# Patient Record
Sex: FEMALE | Race: WHITE | Employment: FULL TIME | URBAN - METROPOLITAN AREA
[De-identification: names, ages, dates, MRNs, and addresses within clinical notes are randomized per-mention and may not be internally consistent; named-entity substitution may affect disease eponyms.]

---

## 2017-02-23 ENCOUNTER — OFFICE VISIT (OUTPATIENT)
Dept: INTERNAL MEDICINE CLINIC | Age: 27
End: 2017-02-23

## 2017-02-23 VITALS
TEMPERATURE: 99.2 F | HEART RATE: 104 BPM | SYSTOLIC BLOOD PRESSURE: 139 MMHG | DIASTOLIC BLOOD PRESSURE: 69 MMHG | WEIGHT: 171.2 LBS | HEIGHT: 66 IN | RESPIRATION RATE: 16 BRPM | BODY MASS INDEX: 27.51 KG/M2 | OXYGEN SATURATION: 98 %

## 2017-02-23 DIAGNOSIS — Z82.49 FAM HX-ISCHEM HEART DISEASE: ICD-10-CM

## 2017-02-23 DIAGNOSIS — Z00.00 ROUTINE GENERAL MEDICAL EXAMINATION AT A HEALTH CARE FACILITY: Primary | ICD-10-CM

## 2017-02-23 RX ORDER — BISMUTH SUBSALICYLATE 262 MG
1 TABLET,CHEWABLE ORAL DAILY
COMMUNITY
End: 2019-01-07 | Stop reason: ALTCHOICE

## 2017-02-23 RX ORDER — NORETHINDRONE ACETATE AND ETHINYL ESTRADIOL AND FERROUS FUMARATE 1MG-20(24)
KIT ORAL
Refills: 11 | COMMUNITY
Start: 2017-02-13 | End: 2019-01-07 | Stop reason: ALTCHOICE

## 2017-02-23 NOTE — PROGRESS NOTES
Subjective:   27 y.o. female for Well Woman Check. She is a new patient to our practice. Her gyne and breast care is done elsewhere by her Ob-Gyne physician.  3/2016 with Dr. Melony Meek. Patient Active Problem List    Diagnosis Date Noted    Anxiety      Current Outpatient Prescriptions   Medication Sig Dispense Refill    MINASTRIN 24 FE 1 mg-20 mcg(24) /75 mg (4) chew CHEW ONE TABLET AT THE SAME TIME DAILY  11    multivitamin (ONE A DAY) tablet Take 1 Tab by mouth daily. No Known Allergies  Past Medical History:   Diagnosis Date    Anxiety      Past Surgical History:   Procedure Laterality Date    HX APPENDECTOMY  2009     Family History   Problem Relation Age of Onset    Psychiatric Disorder Mother      severe depression, substance abuse    Hypertension Mother     Alcohol abuse Mother     Hypertension Father     Heart Disease Father      MI age 52    Psychiatric Disorder Sister      severe anxiety and depression    Psychiatric Disorder Brother     Heart Disease Paternal Grandmother 28     Fatal MI age 28     Social History   Substance Use Topics    Smoking status: Never Smoker    Smokeless tobacco: Never Used    Alcohol use 0.0 - 0.6 oz/week     0 - 1 Glasses of wine per week         Specific concerns today:   Blood pressure was elevated 2 years ago. Placed on antianxiety and antidepressant medication (Prozac and lorazepam). Helped to lower blood pressure but stopped lorazepam after 1 week - made her tired and tingly, stopped Prozac after 1 month. Was depressed when younger but better. Still with anxiety issues. Has not seen a therapist in the past.  Has issues also every day. Interfering socially - issues talking with people or being in crowds. Boyfriend is calming.       Review of Systems  Constitutional: negative  Eyes: negative  Ears, nose, mouth, throat, and face: negative  Respiratory: negative  Cardiovascular: negative except for palpitations  Gastrointestinal: negative  Genitourinary:negative  Integument/breast: negative  Hematologic/lymphatic: negative  Musculoskeletal:negative except for knee pain. Had + TIFFANY at age 14. Neurological: negative except for headaches  Behavioral/Psych: negative except for anxiety  Endocrine: negative  Allergic/Immunologic: negative    Objective:   Blood pressure 139/69, pulse (!) 104, temperature 99.2 °F (37.3 °C), temperature source Oral, resp. rate 16, height 5' 6\" (1.676 m), weight 171 lb 3.2 oz (77.7 kg), SpO2 98 %. Physical Examination:   General appearance - alert, well appearing, and in no distress and oriented to person, place, and time  Mental status - alert, oriented to person, place, and time, normal mood, behavior, speech, dress, motor activity, and thought processes  Eyes - pupils equal and reactive, extraocular eye movements intact, funduscopic exam normal, discs flat and sharp  Ears - bilateral TM's and external ear canals normal  Nose - normal and patent, no erythema, discharge or polyps  Mouth - mucous membranes moist, pharynx normal without lesions  Neck - supple, no significant adenopathy, carotids upstroke normal bilaterally, no bruits, thyroid exam: thyroid is normal in size without nodules or tenderness  Lymphatics - no palpable lymphadenopathy, no hepatosplenomegaly  Chest - clear to auscultation, no wheezes, rales or rhonchi, symmetric air entry  Heart - normal rate, regular rhythm, normal S1, S2, no murmurs, rubs, clicks or gallops  Abdomen - soft, nontender, nondistended, no masses or organomegaly  bowel sounds normal  Breasts - breasts appear normal, no suspicious masses, no skin or nipple changes or axillary nodes  Back exam - full range of motion, no tenderness, palpable spasm or pain on motion  Neurological - alert, oriented, normal speech, no focal findings or movement disorder noted. Anxious appearing in room.     Musculoskeletal - no joint tenderness, deformity or swelling  Extremities - peripheral pulses normal, no pedal edema, no clubbing or cyanosis  Skin - normal coloration and turgor, no rashes, no suspicious skin lesions noted     Assessment/Plan:   19yo female  Anxiety - discussed starting zoloft. Pt will consider.     HM - regular exercise, weight loss  fam hx early onset CAD - check lipids and cmp  call if any problems  Orders Placed This Encounter    METABOLIC PANEL, COMPREHENSIVE    LIPID PANEL    MINASTRIN 24 FE 1 mg-20 mcg(24) /75 mg (4) chew    multivitamin (ONE A DAY) tablet     Follow-up Disposition: Not on File

## 2017-02-23 NOTE — PATIENT INSTRUCTIONS
[facility] Activation    Thank you for requesting access to [facility]. Please follow the instructions below to securely access and download your online medical record. [facility] allows you to send messages to your doctor, view your test results, renew your prescriptions, schedule appointments, and more. How Do I Sign Up? 1. In your internet browser, go to [email or web address]  2. Click on the First Time User? Click Here link in the Sign In box. You will be redirect to the New Member Sign Up page. 3. Enter your [facility] Access Code exactly as it appears below. You will not need to use this code after youve completed the sign-up process. If you do not sign up before the expiration date, you must request a new code. [facility] Access Code: [email or web address]  Expires: 2017 10:51 AM (This is the date your [facility] access code will )    4. Enter the last four digits of your Social Security Number (xxxx) and Date of Birth (mm/dd/yyyy) as indicated and click Submit. You will be taken to the next sign-up page. 5. Create a [facility] ID. This will be your [facility] login ID and cannot be changed, so think of one that is secure and easy to remember. 6. Create a [facility] password. You can change your password at any time. 7. Enter your Password Reset Question and Answer. This can be used at a later time if you forget your password. 8. Enter your e-mail address. You will receive e-mail notification when new information is available in [address] 9. Click Sign Up. You can now view and download portions of your medical record. 10. Click the Download Summary menu link to download a portable copy of your medical information. Additional Information    If you have questions, please visit the Frequently Asked Questions section of the [facility] website at [email or web address] Remember, [facility] is NOT to be used for urgent needs. For medical emergencies, dial 911.

## 2017-03-10 LAB
ALBUMIN SERPL-MCNC: 4.4 G/DL (ref 3.5–5.5)
ALBUMIN/GLOB SERPL: 1.6 {RATIO} (ref 1.1–2.5)
ALP SERPL-CCNC: 27 IU/L (ref 39–117)
ALT SERPL-CCNC: 10 IU/L (ref 0–32)
AST SERPL-CCNC: 16 IU/L (ref 0–40)
BILIRUB SERPL-MCNC: 0.5 MG/DL (ref 0–1.2)
BUN SERPL-MCNC: 12 MG/DL (ref 6–20)
BUN/CREAT SERPL: 16 (ref 8–20)
CALCIUM SERPL-MCNC: 9 MG/DL (ref 8.7–10.2)
CHLORIDE SERPL-SCNC: 99 MMOL/L (ref 96–106)
CHOLEST SERPL-MCNC: 256 MG/DL (ref 100–199)
CO2 SERPL-SCNC: 23 MMOL/L (ref 18–29)
CREAT SERPL-MCNC: 0.76 MG/DL (ref 0.57–1)
GLOBULIN SER CALC-MCNC: 2.8 G/DL (ref 1.5–4.5)
GLUCOSE SERPL-MCNC: 83 MG/DL (ref 65–99)
HDLC SERPL-MCNC: 67 MG/DL
INTERPRETATION, 910389: NORMAL
LDLC SERPL CALC-MCNC: 173 MG/DL (ref 0–99)
POTASSIUM SERPL-SCNC: 4.1 MMOL/L (ref 3.5–5.2)
PROT SERPL-MCNC: 7.2 G/DL (ref 6–8.5)
SODIUM SERPL-SCNC: 139 MMOL/L (ref 134–144)
TRIGL SERPL-MCNC: 81 MG/DL (ref 0–149)
VLDLC SERPL CALC-MCNC: 16 MG/DL (ref 5–40)

## 2017-04-24 ENCOUNTER — TELEPHONE (OUTPATIENT)
Dept: INTERNAL MEDICINE CLINIC | Age: 27
End: 2017-04-24

## 2017-04-24 RX ORDER — SERTRALINE HYDROCHLORIDE 50 MG/1
50 TABLET, FILM COATED ORAL DAILY
Qty: 30 TAB | Refills: 5 | Status: SHIPPED | OUTPATIENT
Start: 2017-04-24 | End: 2019-01-07 | Stop reason: ALTCHOICE

## 2017-04-24 NOTE — TELEPHONE ENCOUNTER
----- Message from [facility] sent at 4/24/2017  8:30 AM EDT -----  Regarding: FW: Visit Follow-Up Question  Contact: [phone]      ----- Message -----     From: Mala Leahy LPN     Sent: 1/22/9825   8:05 AM       To: Didier Ortega  Subject: FW: Visit Follow-Up Question                         ----- Message -----     From: Vivian Ríos     Sent: 4/21/2017   9:54 PM       To: Hector Jin  Subject: Visit Follow-Up Question                         I have thought about what we talked about and I would like to go ahead and try the medication for anxiety and depression you suggested.

## 2017-04-27 ENCOUNTER — TELEPHONE (OUTPATIENT)
Dept: INTERNAL MEDICINE CLINIC | Age: 27
End: 2017-04-27

## 2017-04-27 RX ORDER — ALPRAZOLAM 0.5 MG/1
.25-.5 TABLET ORAL
Qty: 30 TAB | Refills: 0 | OUTPATIENT
Start: 2017-04-27 | End: 2017-07-18 | Stop reason: SDUPTHER

## 2017-04-27 NOTE — TELEPHONE ENCOUNTER
I sent her a message on mychart earlier today. Please stress she needs to continue on the zoloft. Takes 4-6 weeks to see full effects. Given rx for xanax to take SPARINGLy - this should be in your box to call in.

## 2017-04-27 NOTE — TELEPHONE ENCOUNTER
Pt stated the Zoloft is not working and she still has anxiety. She is requesting a different medication. Pts number is [phone] and [facility].              From answering service

## 2017-04-27 NOTE — TELEPHONE ENCOUNTER
----- Message from Tristen Sena LPN sent at 0/00/0982  7:55 AM EDT -----  Regarding: FW: Visit Follow-Up Question  Contact: [phone]      ----- Message -----     From: Papa Espino     Sent: 4/26/2017   7:30 PM       To: Marshall Jin  Subject: RE: Visit Follow-Up Question                     The last week I have been having really bad anxiety attacks. I've been having trouble eating,  being nauseous, feeling a heaviness in my chest like I can't breathe, and feeling hyper sensitive. It's gotten to the point where I'm having troubles sleeping now. [facility] and [facility] told me it was anxiety and to follow up with you.  ----- Message -----  From: Finesse Andres MD  Sent: 4/24/2017  2:09 PM EDT  To: Papa Espino  Subject: RE: Visit Follow-Up Question    I have sent in the prescription.      ----- Message -----     From: Papa Espino     Sent: 4/24/2017 12:24 PM EDT       To: Finesse Andres MD  Subject: RE: Visit Follow-Up Question    [facility]. Thank you for your help.  ----- Message -----  From: Finesse Andres MD  Sent: 4/24/2017  8:46 AM EDT  To: Papa Espino  Subject: RE: Visit Follow-Up Question    I'm sorry, I don't have a pharmacy on file. What local pharmacy do you prefer?    ----- Message -----     From: Papa Espino     Sent: 4/21/2017  9:54 PM EDT       To: Finesse Andres MD  Subject: Visit Follow-Up Question    I have thought about what we talked about and I would like to go ahead and try the medication for anxiety and depression you suggested.

## 2017-04-28 NOTE — TELEPHONE ENCOUNTER
Discussed. Will decrease zoloft to 25mg for 1-2 weeks then increase back to 50. Reassured her that panic attacks were not coming from the zoloft but that anxiety often gets worse before it gets better. Xanax prn.   Referred to psych

## 2017-04-28 NOTE — TELEPHONE ENCOUNTER
Patient is not comfortable taking the prescribed dose. States if you need to see her to discuss she will come in. Please advise. You should have some my chart messages as well. Not sure if some of these crossed.

## 2019-01-07 ENCOUNTER — OFFICE VISIT (OUTPATIENT)
Dept: INTERNAL MEDICINE CLINIC | Age: 29
End: 2019-01-07

## 2019-01-07 VITALS
DIASTOLIC BLOOD PRESSURE: 78 MMHG | RESPIRATION RATE: 16 BRPM | SYSTOLIC BLOOD PRESSURE: 122 MMHG | WEIGHT: 225 LBS | HEIGHT: 66 IN | TEMPERATURE: 98.6 F | HEART RATE: 80 BPM | OXYGEN SATURATION: 97 % | BODY MASS INDEX: 36.16 KG/M2

## 2019-01-07 DIAGNOSIS — Z00.00 ROUTINE GENERAL MEDICAL EXAMINATION AT A HEALTH CARE FACILITY: Primary | ICD-10-CM

## 2019-01-07 PROBLEM — E66.01 SEVERE OBESITY (HCC): Status: ACTIVE | Noted: 2019-01-07

## 2019-01-07 RX ORDER — NORETHINDRONE ACETATE AND ETHINYL ESTRADIOL AND FERROUS FUMARATE 1MG-20(21)
KIT ORAL
Refills: 12 | COMMUNITY
Start: 2018-12-20 | End: 2021-06-27

## 2019-01-07 NOTE — PROGRESS NOTES
Subjective:  
34 y.o. female for Well Woman Check. Her gyne and breast care is done elsewhere by her Ob-Gyne physician. UTD with pap. Due for yearly f/u Patient Active Problem List  
 Diagnosis Date Noted  Severe obesity ([facility] 01/07/2019  Fam hx-ischem heart disease 02/23/2017  Anxiety Current Outpatient Medications Medication Sig Dispense Refill  JUNEL FE 1/20, 28, 1 mg-20 mcg (21)/75 mg (7) tab TAKE 1 TABLET BY MOUTH EVERY DAY  12 No Known Allergies Past Medical History:  
Diagnosis Date  Anxiety  Sepsis ([facility] 09/2018 Past Surgical History:  
Procedure Laterality Date  HX APPENDECTOMY  2009 Family History Problem Relation Age of Onset  Psychiatric Disorder Mother   
     severe depression, substance abuse  Hypertension Mother  Alcohol abuse Mother  Hypertension Father  Heart Disease Father MI age 46  Psychiatric Disorder Sister   
     severe anxiety and depression  Psychiatric Disorder Brother  Heart Disease Paternal Grandmother 28 Fatal MI age 28 Social History Tobacco Use  Smoking status: Never Smoker  Smokeless tobacco: Never Used Substance Use Topics  Alcohol use: Yes Alcohol/week: 0.0 - 0.6 oz Specific concerns today:  
Weight gain in the last year. Feels when taking the antidepressants lost all motivation. Stopped going to the gym and wasn't eating well. Stopped her meds in November, feels anxiety/depression is better. Planning on restarting at the gym. She is eating better. In 2017 she was able to drop to 140. She was 170 in the fall of that year. Review of Systems Constitutional: negative Eyes: negative except for contacts/glasses and last eye exam appro 1.5 years ago Ears, nose, mouth, throat, and face: negative Respiratory: negative Cardiovascular: negative Gastrointestinal: negative Genitourinary:negative Integument/breast: negative Hematologic/lymphatic: negative Musculoskeletal:negative Neurological: negative Behavioral/Psych: negative Endocrine: negative Allergic/Immunologic: negative Objective:  
Blood pressure 122/78, pulse 80, temperature 98.6 °F (37 °C), temperature source Oral, resp. rate 16, height 5' 6\" (1.676 m), weight 225 lb (102.1 kg), SpO2 97 %. Physical Examination:  
General appearance - alert, well appearing, and in no distress and oriented to person, place, and time Mental status - alert, oriented to person, place, and time, normal mood, behavior, speech, dress, motor activity, and thought processes Eyes - pupils equal and reactive, extraocular eye movements intact Ears - bilateral TM's and external ear canals normal 
Nose - normal and patent, no erythema, discharge or polyps Mouth - mucous membranes moist, pharynx normal without lesions Neck - supple, no significant adenopathy, carotids upstroke normal bilaterally, no bruits, thyroid exam: thyroid is normal in size without nodules or tenderness Lymphatics - no palpable lymphadenopathy, no hepatosplenomegaly Chest - clear to auscultation, no wheezes, rales or rhonchi, symmetric air entry Heart - normal rate, regular rhythm, normal S1, S2, no murmurs, rubs, clicks or gallops Abdomen - soft, nontender, nondistended, no masses or organomegaly 
bowel sounds normal 
Breasts - breasts appear normal, no suspicious masses, no skin or nipple changes or axillary nodes Back exam - full range of motion, no tenderness, palpable spasm or pain on motion Neurological - alert, oriented, normal speech, no focal findings or movement disorder noted Musculoskeletal - no joint tenderness, deformity or swelling Extremities - peripheral pulses normal, no pedal edema, no clubbing or cyanosis Skin - normal coloration and turgor, no rashes, no suspicious skin lesions noted Assessment/Plan:  
33yo female Depression - improved, continue off meds Severe obesity - discussed improved diet and exercise for weight loss. With recent severe weight gain, will check TSH Hyperlipidemia - repeat labs HM - declines flu shot 
call if any problems Orders Placed This Encounter  METABOLIC PANEL, COMPREHENSIVE  LIPID PANEL  
 TSH 3RD GENERATION  
 JUNEL FE 1/20, 28, 1 mg-20 mcg (21)/75 mg (7) tab Follow-up Disposition: Not on File

## 2019-01-07 NOTE — PROGRESS NOTES
Patient identified with 2 ID's, Name and  Dusty Hector is a 34 y.o. female Chief Complaint Patient presents with  Annual Exam  
 
 
1. Have you been to the ER, urgent care clinic since your last visit? Hospitalized since your last visit? Yes admitted to [facility] St[facility] for pneumonia then turned septic 2018 2. Have you seen or consulted any other health care providers outside of the [address] [place] [address] since your last visit? Include any pap smears or colon screening. None other than above episode Health Maintenance Topic Date Due  
 HPV Age 9Y-34Y (1 - Female 3-dose series) 2003  DTaP/Tdap/Td series (1 - Tdap) 2013  PAP AKA CERVICAL CYTOLOGY  2017  Influenza Age 5 to Adult  2019 (Originally 2018) HPV have been completed, Tdap not done-recommended, PAP done 2018, flu vac. declined Visit Vitals /78 (BP 1 Location: Left arm, BP Patient Position: Sitting) Pulse 80 Temp 98.6 °F (37 °C) (Oral) Resp 16 Ht 5' 6\" (1.676 m) Wt 225 lb (102.1 kg) SpO2 97% BMI 36.32 kg/m² Medication Reconciliation reviewed with patient on this date PHQ over the last two weeks 2019 Little interest or pleasure in doing things Not at all Feeling down, depressed, irritable, or hopeless Not at all Total Score PHQ 2 0 Learning Assessment 2017 PRIMARY LEARNER Patient HIGHEST LEVEL OF EDUCATION - PRIMARY LEARNER  SOME COLLEGE  
BARRIERS PRIMARY LEARNER NONE  
CO-LEARNER CAREGIVER No  
PRIMARY LANGUAGE ENGLISH  NEED No  
LEARNER PREFERENCE PRIMARY DEMONSTRATION  
ANSWERED BY patient RELATIONSHIP SELF

## 2019-02-07 ENCOUNTER — OFFICE VISIT (OUTPATIENT)
Dept: INTERNAL MEDICINE CLINIC | Age: 29
End: 2019-02-07

## 2019-02-07 ENCOUNTER — TELEPHONE (OUTPATIENT)
Dept: INTERNAL MEDICINE CLINIC | Age: 29
End: 2019-02-07

## 2019-02-07 VITALS
HEART RATE: 88 BPM | OXYGEN SATURATION: 98 % | SYSTOLIC BLOOD PRESSURE: 119 MMHG | TEMPERATURE: 97.9 F | BODY MASS INDEX: 35.84 KG/M2 | RESPIRATION RATE: 18 BRPM | DIASTOLIC BLOOD PRESSURE: 76 MMHG | WEIGHT: 223 LBS | HEIGHT: 66 IN

## 2019-02-07 DIAGNOSIS — R79.89 ELEVATED TSH: Primary | ICD-10-CM

## 2019-02-07 DIAGNOSIS — J45.30 MILD PERSISTENT REACTIVE AIRWAY DISEASE WITHOUT COMPLICATION: Primary | ICD-10-CM

## 2019-02-07 LAB
ALBUMIN SERPL-MCNC: 3.9 G/DL (ref 3.5–5.5)
ALBUMIN/GLOB SERPL: 1.3 {RATIO} (ref 1.2–2.2)
ALP SERPL-CCNC: 40 IU/L (ref 39–117)
ALT SERPL-CCNC: 9 IU/L (ref 0–32)
AST SERPL-CCNC: 12 IU/L (ref 0–40)
BILIRUB SERPL-MCNC: 0.3 MG/DL (ref 0–1.2)
BUN SERPL-MCNC: 9 MG/DL (ref 6–20)
BUN/CREAT SERPL: 12 (ref 9–23)
CALCIUM SERPL-MCNC: 9.5 MG/DL (ref 8.7–10.2)
CHLORIDE SERPL-SCNC: 105 MMOL/L (ref 96–106)
CHOLEST SERPL-MCNC: 249 MG/DL (ref 100–199)
CO2 SERPL-SCNC: 22 MMOL/L (ref 20–29)
CREAT SERPL-MCNC: 0.78 MG/DL (ref 0.57–1)
GLOBULIN SER CALC-MCNC: 3.1 G/DL (ref 1.5–4.5)
GLUCOSE SERPL-MCNC: 92 MG/DL (ref 65–99)
HDLC SERPL-MCNC: 52 MG/DL
INTERPRETATION, 910389: NORMAL
LDLC SERPL CALC-MCNC: 166 MG/DL (ref 0–99)
POTASSIUM SERPL-SCNC: 5.1 MMOL/L (ref 3.5–5.2)
PROT SERPL-MCNC: 7 G/DL (ref 6–8.5)
SODIUM SERPL-SCNC: 141 MMOL/L (ref 134–144)
TRIGL SERPL-MCNC: 157 MG/DL (ref 0–149)
TSH SERPL DL<=0.005 MIU/L-ACNC: 6.19 UIU/ML (ref 0.45–4.5)
VLDLC SERPL CALC-MCNC: 31 MG/DL (ref 5–40)

## 2019-02-07 RX ORDER — FLUTICASONE FUROATE AND VILANTEROL 100; 25 UG/1; UG/1
1 POWDER RESPIRATORY (INHALATION) DAILY
Qty: 1 INHALER | Refills: 0 | Status: SHIPPED | OUTPATIENT
Start: 2019-02-07 | End: 2020-03-04 | Stop reason: ALTCHOICE

## 2019-02-07 NOTE — PROGRESS NOTES
HISTORY OF PRESENT ILLNESS Mehdi Guadalupe is a 34 y.o. female. Coughing for last 1.5 months. [facility]. CXR was normal.  Placed on zpack which did not help. Paroxysmal cough, last was 2 days ago, lasted for 2-3 hours and vomited. Wheezing while coughing. Denies sinus pressure, post nasal drainage, rhinorrhea, sore throat or ear pain. In between episodes feels fine. No preceding cold. Last couple of times occurred at her dad's house. Last time had paroxysmal cough was post hospitalization for pneumonia in fall 2018. Lasted another and then resolved but now back again. No new environmental exposures. Current Outpatient Medications:  
  JUNEL FE 1/20, 28, 1 mg-20 mcg (21)/75 mg (7) tab, TAKE 1 TABLET BY MOUTH EVERY DAY, Disp: , Rfl: 12 Visit Vitals /76 (BP 1 Location: Left arm, BP Patient Position: At rest) Pulse 88 Temp 97.9 °F (36.6 °C) (Oral) Resp 18 Ht 5' 6\" (1.676 m) Wt 223 lb (101.2 kg) SpO2 98% BMI 35.99 kg/m² ROS See above. Physical Exam  
Constitutional: She appears well-developed and well-nourished. HENT:  
Head: Normocephalic and atraumatic. Right Ear: External ear normal.  
Left Ear: External ear normal.  
Nose: Nose normal.  
Mouth/Throat: Oropharynx is clear and moist. No oropharyngeal exudate. Neck: Neck supple. Cardiovascular: Normal rate, regular rhythm and normal heart sounds. Exam reveals no gallop and no friction rub. No murmur heard. Pulmonary/Chest: Effort normal and breath sounds normal.  
Lymphadenopathy:  
  She has no cervical adenopathy. Vitals reviewed. ASSESSMENT and PLAN Paroxysmal cough/reactive airway dz - does not appear allergy related. ? Inflammation still related to recent pneumonia. Trial Breo x 1 month. May need PFTs Orders Placed This Encounter  fluticasone-vilanterol (BREO ELLIPTA) 100-25 mcg/dose inhaler Follow-up Disposition: 
Return in about 4 weeks (around 3/7/2019) for sully airway gurpreet.

## 2019-02-07 NOTE — PROGRESS NOTES
1. Have you been to the ER, urgent care clinic since your last visit? Hospitalized since your last visit? No 
 
2. Have you seen or consulted any other health care providers outside of the [address] [place] [address] since your last visit? Include any pap smears or colon screening. No  
Chief Complaint Patient presents with  Cough  
  for about a month and a half Not fasting Abuse Screening Questionnaire 2/7/2019 Do you ever feel afraid of your partner? Shellie Enriquez Are you in a relationship with someone who physically or mentally threatens you? Shellie Enriquez Is it safe for you to go home? Y  
 
PHQ over the last two weeks 2/7/2019 Little interest or pleasure in doing things Not at all Feeling down, depressed, irritable, or hopeless Not at all Total Score PHQ 2 0

## 2019-02-14 LAB
T4 FREE SERPL-MCNC: 1.16 NG/DL (ref 0.82–1.77)
TSH SERPL DL<=0.005 MIU/L-ACNC: 5.45 UIU/ML (ref 0.45–4.5)

## 2019-02-15 ENCOUNTER — TELEPHONE (OUTPATIENT)
Dept: INTERNAL MEDICINE CLINIC | Age: 29
End: 2019-02-15

## 2019-02-15 DIAGNOSIS — E03.9 ACQUIRED HYPOTHYROIDISM: Primary | ICD-10-CM

## 2019-02-15 RX ORDER — LEVOTHYROXINE SODIUM 50 UG/1
50 TABLET ORAL
Qty: 30 TAB | Refills: 11 | Status: SHIPPED | OUTPATIENT
Start: 2019-02-15 | End: 2019-05-28 | Stop reason: SDUPTHER

## 2019-02-15 NOTE — TELEPHONE ENCOUNTER
Repeat TSH confirms hypothyroidism. Both sisters are hypothyroid. Will start levothyroxine 50mg every day. Repeat labs in 6 weeks.

## 2019-03-20 ENCOUNTER — OFFICE VISIT (OUTPATIENT)
Dept: INTERNAL MEDICINE CLINIC | Age: 29
End: 2019-03-20

## 2019-03-20 VITALS
OXYGEN SATURATION: 98 % | DIASTOLIC BLOOD PRESSURE: 72 MMHG | HEIGHT: 66 IN | SYSTOLIC BLOOD PRESSURE: 109 MMHG | WEIGHT: 223 LBS | TEMPERATURE: 98.2 F | BODY MASS INDEX: 35.84 KG/M2 | HEART RATE: 72 BPM

## 2019-03-20 DIAGNOSIS — J45.30 MILD PERSISTENT REACTIVE AIRWAY DISEASE WITHOUT COMPLICATION: Primary | ICD-10-CM

## 2019-03-29 ENCOUNTER — HOSPITAL ENCOUNTER (OUTPATIENT)
Dept: PULMONOLOGY | Age: 29
Discharge: HOME | End: 2019-03-29
Attending: INTERNAL MEDICINE
Payer: COMMERCIAL

## 2019-03-29 DIAGNOSIS — J45.30 MILD PERSISTENT REACTIVE AIRWAY DISEASE WITHOUT COMPLICATION: ICD-10-CM

## 2019-03-29 DIAGNOSIS — E03.9 ACQUIRED HYPOTHYROIDISM: ICD-10-CM

## 2019-03-29 PROCEDURE — 94010 BREATHING CAPACITY TEST: CPT

## 2019-05-28 DIAGNOSIS — E03.9 ACQUIRED HYPOTHYROIDISM: ICD-10-CM

## 2019-05-28 RX ORDER — LEVOTHYROXINE SODIUM 50 UG/1
50 TABLET ORAL
Qty: 30 TAB | Refills: 11 | Status: SHIPPED | OUTPATIENT
Start: 2019-05-28 | End: 2019-05-28 | Stop reason: SDUPTHER

## 2020-03-04 ENCOUNTER — OFFICE VISIT (OUTPATIENT)
Dept: INTERNAL MEDICINE CLINIC | Age: 30
End: 2020-03-04

## 2020-03-04 VITALS
WEIGHT: 193 LBS | TEMPERATURE: 98.5 F | HEIGHT: 66 IN | SYSTOLIC BLOOD PRESSURE: 119 MMHG | BODY MASS INDEX: 31.02 KG/M2 | DIASTOLIC BLOOD PRESSURE: 74 MMHG | OXYGEN SATURATION: 98 % | HEART RATE: 92 BPM

## 2020-03-04 DIAGNOSIS — E03.9 ACQUIRED HYPOTHYROIDISM: Primary | ICD-10-CM

## 2020-03-04 NOTE — PROGRESS NOTES
Chief Complaint   Patient presents with    Follow-up     Visit Vitals  /74   Pulse 92   Temp 98.5 °F (36.9 °C) (Oral)   Ht 5' 6\" (1.676 m)   Wt 193 lb (87.5 kg)   SpO2 98%   BMI 31.15 kg/m²     1. Have you been to the ER, urgent care clinic since your last visit? Hospitalized since your last visit? no    2. Have you seen or consulted any other health care providers outside of the [address] [place] [address] since your last visit? Include any pap smears or colon screening.  no

## 2020-03-04 NOTE — PROGRESS NOTES
Subjective:      Sushant Hernandez is an 34 y.o. female who presents for followup of hypothyroidism. Thyroid ROS: denies fatigue, weight changes, heat/cold intolerance, bowel/skin changes or CVS symptoms. She has lost 30 lbs since starting the levothyroxine. Now eating healthier and continuing to lose weight. Increased steps to 7000 per day and exercising 3 days a week. UTD with gyn. Current Outpatient Medications   Medication Sig Dispense Refill    levothyroxine (SYNTHROID) 50 mcg tablet TAKE 1 TABLET BY MOUTH EVERY DAY BEFORE BREAKFAST 90 Tab 3    JUNEL FE 1/20, 28, 1 mg-20 mcg (21)/75 mg (7) tab TAKE 1 TABLET BY MOUTH EVERY DAY  12        Objective:     Visit Vitals  /74   Pulse 92   Temp 98.5 °F (36.9 °C) (Oral)   Ht 5' 6\" (1.676 m)   Wt 193 lb (87.5 kg)   SpO2 98%   BMI 31.15 kg/m²     Exam:  thyroid is normal in size without nodules or tenderness. NECK: supple, no lad, no bruit  LUNGS: cta bilat  CV rrr, no m/g/r  ABD: soft, nt, nd, nabs  EXT: no c/c/e    Laboratory:  Lab Results   Component Value Date/Time    TSH 5.450 (H) 02/13/2019 12:04 PM       Assessment/Plan:     hypothyroidism well controlled, stable, asymptomatic.   current treatment plan effective, no change in therapy. Check labs    Obesity -weight loss throughout the last year. Continue improved diet and exercise for further weight loss. Orders Placed This Encounter    TSH 3RD GENERATION    T4, FREE     Follow-up and Dispositions    · Return in about 1 year (around 3/4/2021).      Solomon Yousif

## 2020-03-09 ENCOUNTER — HOSPITAL ENCOUNTER (OUTPATIENT)
Dept: LAB | Age: 30
Discharge: HOME | End: 2020-03-09

## 2020-03-09 DIAGNOSIS — E03.9 ACQUIRED HYPOTHYROIDISM: ICD-10-CM

## 2020-03-09 LAB
T4 FREE SERPL-MCNC: 1.1 NG/DL (ref 0.8–1.5)
TSH SERPL DL<=0.05 MIU/L-ACNC: 6.58 UIU/ML (ref 0.36–3.74)

## 2020-03-10 ENCOUNTER — TELEPHONE (OUTPATIENT)
Dept: INTERNAL MEDICINE CLINIC | Age: 30
End: 2020-03-10

## 2020-03-10 DIAGNOSIS — E03.9 ACQUIRED HYPOTHYROIDISM: Primary | ICD-10-CM

## 2020-03-10 RX ORDER — LEVOTHYROXINE SODIUM 75 UG/1
75 TABLET ORAL
Qty: 30 TAB | Refills: 11 | Status: SHIPPED | OUTPATIENT
Start: 2020-03-10 | End: 2020-03-30 | Stop reason: SDUPTHER

## 2020-03-10 NOTE — TELEPHONE ENCOUNTER
tsh elevated. Will increase Levothyroxine to 75mcg every day. Repeat labs in 6 weeks. Pt notified via I'mOKt.

## 2020-03-20 ENCOUNTER — TELEPHONE (OUTPATIENT)
Dept: INTERNAL MEDICINE CLINIC | Age: 30
End: 2020-03-20

## 2020-03-20 RX ORDER — VARENICLINE TARTRATE 25 MG
KIT ORAL
Qty: 1 DOSE PACK | Refills: 0 | Status: SHIPPED | OUTPATIENT
Start: 2020-03-20 | End: 2020-03-30 | Stop reason: SDUPTHER

## 2020-03-20 RX ORDER — VARENICLINE TARTRATE 1 MG/1
1 TABLET, FILM COATED ORAL 2 TIMES DAILY
Qty: 60 TAB | Refills: 1 | Status: SHIPPED | OUTPATIENT
Start: 2020-03-20 | End: 2020-03-30 | Stop reason: SDUPTHER

## 2020-03-20 NOTE — TELEPHONE ENCOUNTER
----- Message from Lluvia Velasco LPN sent at 6/10/5284 11:38 AM EDT -----  Regarding: FW: Prescription Question  Contact: [phone]    ----- Message -----  From: Matthew Moses  Sent: 3/20/2020   9:58 AM EDT  To: Daljit Figueroa [place]  Subject: Prescription Question                            Zoie Shirley, Hope you are well. I was wondering if you would be able to prescribe me something to help quit smoking. I smoke about a pack a day and really want to try to quit. I have tried cold turkey before and was very irritable and depressed, so I did not go through with it.

## 2020-03-30 ENCOUNTER — PATIENT MESSAGE (OUTPATIENT)
Dept: INTERNAL MEDICINE CLINIC | Age: 30
End: 2020-03-30

## 2020-03-30 RX ORDER — LEVOTHYROXINE SODIUM 75 UG/1
75 TABLET ORAL
Qty: 30 TAB | Refills: 11 | Status: SHIPPED | OUTPATIENT
Start: 2020-03-30 | End: 2021-06-27

## 2020-03-30 RX ORDER — VARENICLINE TARTRATE 25 MG
KIT ORAL
Qty: 1 DOSE PACK | Refills: 0 | Status: SHIPPED | OUTPATIENT
Start: 2020-03-30 | End: 2020-06-04 | Stop reason: ALTCHOICE

## 2020-03-30 RX ORDER — VARENICLINE TARTRATE 1 MG/1
1 TABLET, FILM COATED ORAL 2 TIMES DAILY
Qty: 60 TAB | Refills: 1 | Status: SHIPPED | OUTPATIENT
Start: 2020-03-30 | End: 2020-06-04 | Stop reason: ALTCHOICE

## 2020-05-21 ENCOUNTER — HOSPITAL ENCOUNTER (OUTPATIENT)
Dept: LAB | Age: 30
Discharge: HOME | End: 2020-05-21

## 2020-05-21 DIAGNOSIS — E03.9 ACQUIRED HYPOTHYROIDISM: ICD-10-CM

## 2020-05-21 LAB
T4 FREE SERPL-MCNC: 1.2 NG/DL (ref 0.8–1.5)
TSH SERPL DL<=0.05 MIU/L-ACNC: 2.74 UIU/ML (ref 0.36–3.74)

## 2020-06-04 ENCOUNTER — VIRTUAL VISIT (OUTPATIENT)
Dept: INTERNAL MEDICINE CLINIC | Age: 30
End: 2020-06-04

## 2020-06-04 ENCOUNTER — PATIENT MESSAGE (OUTPATIENT)
Dept: INTERNAL MEDICINE CLINIC | Age: 30
End: 2020-06-04

## 2020-06-04 DIAGNOSIS — N30.00 ACUTE CYSTITIS WITHOUT HEMATURIA: Primary | ICD-10-CM

## 2020-06-04 RX ORDER — CIPROFLOXACIN 250 MG/1
250 TABLET, FILM COATED ORAL 2 TIMES DAILY
Qty: 6 TAB | Refills: 0 | Status: SHIPPED | OUTPATIENT
Start: 2020-06-04 | End: 2020-06-07

## 2020-06-04 NOTE — PROGRESS NOTES
Consent: Chato Colorado, who was seen by synchronous (real-time) audio-video technology, and/or her healthcare decision maker, is aware that this patient-initiated, Telehealth encounter on 6/4/2020 is a billable service, with coverage as determined by her insurance carrier. She is aware that she may receive a bill and has provided verbal consent to proceed: Yes. Chato Colorado is a 34 y.o. female being evaluated by a Virtual Visit (video visit) encounter to address concerns as mentioned above. A caregiver was present when appropriate. Due to this being a TeleHealth encounter ([facility]), evaluation of the following organ systems was limited: Vitals/Constitutional/EENT/Resp/CV/GI//MS/Neuro/Skin/Heme-Lymph-Imm. Pursuant to the emergency declaration under the [address] [place] [address] and the [employer] Act, this Virtual Visit was conducted with patient's (and/or legal guardian's) consent, to reduce the risk of exposure to COVID-19 and provide necessary medical care. Services were provided through a video synchronous discussion virtually to substitute for in-person encounter. --Zaida Fothergill, MD on 6/4/2020 at 1:24 PM    An electronic signature was used to authenticate this note. HISTORY OF PRESENT ILLNESS  Chato Colorado is a 34 y.o. female. HPI  For a few weeks feeling tired and fobby brained. For the last 48-72 hours having random pains in her joints and  Nausea. Vomited x 1 after lunch. Also with increased urination. Awoke this am with a fever 101. 2. No burning with urination, no blood or mucous in urination. States random joint pains have been intermittent for the last 10 years. No coughing or sob. No known COVID+ contacts. Has \"mostly\" been staying at home.         Current Outpatient Medications:     levothyroxine (SYNTHROID) 75 mcg tablet, Take 1 Tab by mouth Daily (before breakfast). , Disp: 30 Tab, Rfl: 11    JUNEL FE 1/20, 28, 1 mg-20 mcg (21)/75 mg (7) tab, TAKE 1 TABLET BY MOUTH EVERY DAY, Disp: , Rfl: 12    There were no vitals taken for this visit. ROS  See above  Physical Exam  Vitals signs reviewed. Constitutional:       Appearance: Normal appearance. HENT:      Head: Normocephalic and atraumatic. Neck:      Comments: nml appearance  Pulmonary:      Effort: Pulmonary effort is normal.      Comments: nml rate  Abdominal:      General: There is no distension. Palpations: Abdomen is soft. There is no mass. Tenderness: There is no abdominal tenderness. Comments: Per patient   Neurological:      Mental Status: She is alert and oriented to person, place, and time. ASSESSMENT and PLAN  ? UTI vs COVID - will treat with CIPRO for UTI. If awakens tomorrow and still feels poorly or continued fevers strongly encouraged her to go to [facility] flu clinic for COVID testing.     Orders Placed This Encounter    ciprofloxacin HCl (CIPRO) 250 mg tablet

## 2020-06-10 NOTE — TELEPHONE ENCOUNTER
Spoke with patient. States that she is currently at [facility]. No further action needed at this time.

## 2020-06-12 ENCOUNTER — TELEPHONE (OUTPATIENT)
Dept: INTERNAL MEDICINE CLINIC | Age: 30
End: 2020-06-12

## 2020-06-12 DIAGNOSIS — R53.83 FATIGUE, UNSPECIFIED TYPE: Primary | ICD-10-CM

## 2020-06-12 DIAGNOSIS — J02.9 SORE THROAT: ICD-10-CM

## 2020-06-12 NOTE — TELEPHONE ENCOUNTER
Discussed with patient and labs from [facility] reviewed.    + WBC in urine, treated with Macrobid but culture resulted negative so told to stop meds. She was previously treated with Cipro for UTI last week by us empirically. She also had a negative COVID test on 6/8 at [facility]. Current symptoms of epigastric abdominal pain worse with deep breaths. No worse with eating but decreased appetite and some nausea. Tired with no energy, headache, \"hot flashes\" all the time. Yesterday started with a sore throat. ON labs  - notible for increasd monocytes, increased lymphocytes and decreased segs. ? Mono. Will repeat CBC and monospot Monday at lab.   Over weekend, tylenol/advil, plenty of fluids and rest.

## 2020-06-12 NOTE — TELEPHONE ENCOUNTER
----- Message from Marita Kern LPN sent at 5/20/5536  4:12 PM EDT -----  Regarding: FW: Non-Urgent Medical Question  Contact: [phone]    ----- Message -----  From: Vahe Kingston  Sent: 6/12/2020   3:57 PM EDT  To: Victor Manuel Saldana Nurse [place]  Subject: RE: Non-Urgent Medical Question                  Zoie Shirley,    [facility] diagnosed me with a UTI, (game me Macrobid 100mg 2 times a day) but after checking my cultures today, they told me to stop taking the medicine and it was not a UTI and if my symptoms came back to come back in. My symptoms never stopped, I told them it was not a UTI but they wouldn't listen. Now I have slight congestion and a sore throat on top of my other symptoms.

## 2020-06-15 ENCOUNTER — HOSPITAL ENCOUNTER (OUTPATIENT)
Dept: LAB | Age: 30
Discharge: HOME | End: 2020-06-15

## 2020-06-15 DIAGNOSIS — J02.9 SORE THROAT: ICD-10-CM

## 2020-06-15 DIAGNOSIS — R53.83 FATIGUE, UNSPECIFIED TYPE: ICD-10-CM

## 2020-06-15 LAB
BASOPHILS # BLD: 0 K/UL (ref 0–0.1)
BASOPHILS NFR BLD: 0 % (ref 0–1)
DIFFERENTIAL METHOD BLD: ABNORMAL
EOSINOPHIL # BLD: 0.2 K/UL (ref 0–0.4)
EOSINOPHIL NFR BLD: 2 % (ref 0–7)
ERYTHROCYTE [DISTWIDTH] IN BLOOD BY AUTOMATED COUNT: 13.1 % (ref 11.5–14.5)
HCT VFR BLD AUTO: 42.5 % (ref 35–47)
HGB BLD-MCNC: 13.5 G/DL (ref 11.5–16)
IMM GRANULOCYTES # BLD AUTO: 0 K/UL
IMM GRANULOCYTES NFR BLD AUTO: 0 %
LYMPHOCYTES # BLD: 6.6 K/UL (ref 0.8–3.5)
LYMPHOCYTES NFR BLD: 58 % (ref 12–49)
MCH RBC QN AUTO: 29 PG (ref 26–34)
MCHC RBC AUTO-ENTMCNC: 31.8 G/DL (ref 30–36.5)
MCV RBC AUTO: 91.2 FL (ref 80–99)
MONOCYTES # BLD: 0.3 K/UL (ref 0–1)
MONOCYTES NFR BLD: 3 % (ref 5–13)
NEUTS SEG # BLD: 4.1 K/UL (ref 1.8–8)
NEUTS SEG NFR BLD: 37 % (ref 32–75)
NRBC # BLD: 0 K/UL (ref 0–0.01)
NRBC BLD-RTO: 0 PER 100 WBC
PLATELET # BLD AUTO: 239 K/UL (ref 150–400)
PMV BLD AUTO: 10 FL (ref 8.9–12.9)
RBC # BLD AUTO: 4.66 M/UL (ref 3.8–5.2)
RBC MORPH BLD: ABNORMAL
WBC # BLD AUTO: 11.2 K/UL (ref 3.6–11)
WBC MORPH BLD: ABNORMAL

## 2020-06-16 ENCOUNTER — OFFICE VISIT (OUTPATIENT)
Dept: PRIMARY CARE CLINIC | Age: 30
End: 2020-06-16

## 2020-06-16 DIAGNOSIS — J02.9 SORE THROAT: Primary | ICD-10-CM

## 2020-06-16 LAB
EBV NA IGG SER-ACNC: <18 U/ML (ref 0–17.9)
EBV VCA IGG SER-ACNC: 73.9 U/ML (ref 0–17.9)
EBV VCA IGM SER-ACNC: >160 U/ML (ref 0–35.9)
INTERPRETATION, 169995: ABNORMAL
S PYO AG THROAT QL: NEGATIVE
VALID INTERNAL CONTROL?: YES

## 2020-06-16 RX ORDER — LIDOCAINE HYDROCHLORIDE 20 MG/ML
15 SOLUTION OROPHARYNGEAL AS NEEDED
Qty: 1 BOTTLE | Refills: 1 | Status: SHIPPED | OUTPATIENT
Start: 2020-06-16 | End: 2021-06-27

## 2020-06-16 NOTE — PROGRESS NOTES
See scanned form    Tested neg for covid and inc ST for 2 days and painful to swallow    Patient was seen in a Flu Clinic at [facility]. The patient verbally consented to being treated and billed for this visit    **  She is a 34 y.o. female who presents for evalution. Reviewed PmHx, RxHx, FmHx, SocHx, AllgHx and updated and dated in the chart. Patient Active Problem List    Diagnosis    Acquired hypothyroidism    Severe obesity ([facility] [address]    Fam hx-ischem heart disease    Anxiety       Review of Systems - negative except as listed above in the HPI    Objective: There were no vitals filed for this visit. Physical Examination: General appearance - crying  Mouth - erythematous and tonsils hypertrophied with exudate    Assessment/ Plan:   Diagnoses and all orders for this visit:    1. Sore throat  -     lidocaine (XYLOCAINE) 2 % solution; Take 15 mL by mouth as needed for Pain.  -     AMB POC RAPID STREP A=-neg  -labs pending             I have discussed the diagnosis with the patient and the intended plan as seen in the above orders. The patient understands and agrees with the plan. The patient has received an after-visit summary and questions were answered concerning future plans. Medication Side Effects and Warnings were discussed with patient  Patient Labs were reviewed and or requested:  Patient Past Records were reviewed and or requested    Adriel Alvarado M.D. There are no Patient Instructions on file for this visit.

## 2020-06-17 ENCOUNTER — TELEPHONE (OUTPATIENT)
Dept: INTERNAL MEDICINE CLINIC | Age: 30
End: 2020-06-17

## 2020-06-17 RX ORDER — PREDNISONE 10 MG/1
40 TABLET ORAL
Qty: 20 TAB | Refills: 0 | Status: SHIPPED | OUTPATIENT
Start: 2020-06-17 | End: 2020-06-22

## 2020-06-17 NOTE — TELEPHONE ENCOUNTER
Discussed + EBV and negative strep with patient.  + mono. Painful throat. Not eatng much. Discussed lots of liquids, if feels dehydrated needs to go to ER. Prednisone burst for symptoms.

## 2020-11-09 LAB
CHLAMYDIA, EXTERNAL: NEGATIVE
HBSAG, EXTERNAL: NEGATIVE
HIV, EXTERNAL: NON REACTIVE
N. GONORRHEA, EXTERNAL: NEGATIVE
RUBELLA, EXTERNAL: NORMAL
T. PALLIDUM, EXTERNAL: NON REACTIVE
TYPE, ABO & RH, EXTERNAL: NORMAL

## 2021-04-02 ENCOUNTER — OFFICE VISIT (OUTPATIENT)
Dept: NEUROLOGY | Age: 31
End: 2021-04-02
Payer: COMMERCIAL

## 2021-04-02 VITALS
RESPIRATION RATE: 16 BRPM | BODY MASS INDEX: 37.09 KG/M2 | HEART RATE: 99 BPM | DIASTOLIC BLOOD PRESSURE: 68 MMHG | HEIGHT: 66 IN | TEMPERATURE: 96.8 F | WEIGHT: 230.8 LBS | SYSTOLIC BLOOD PRESSURE: 108 MMHG | OXYGEN SATURATION: 100 %

## 2021-04-02 DIAGNOSIS — R20.2 PARESTHESIA: Primary | ICD-10-CM

## 2021-04-02 PROCEDURE — 99244 OFF/OP CNSLTJ NEW/EST MOD 40: CPT | Performed by: PSYCHIATRY & NEUROLOGY

## 2021-04-02 RX ORDER — ASPIRIN 81 MG/1
TABLET ORAL
COMMUNITY
End: 2021-06-27

## 2021-04-02 RX ORDER — LEVOTHYROXINE SODIUM 112 UG/1
TABLET ORAL
COMMUNITY
End: 2021-11-09 | Stop reason: SDUPTHER

## 2021-04-02 NOTE — PROGRESS NOTES
NEUROLOGY NEW PATIENT OFFICE CONSULTATION      2021    RE: Jocelyne Terrazas         1990      REFERRED BY:  Vilma Owen MD        CHIEF COMPLAINT:  This is Jocelyne Terrazas is a 31 y.o. female left handed works from home  [employer] who had concerns including New Patient (Episode has happened 3 times. Tingling and feels heavy on left side of head. Cant think through full sentences then spaces out. spreads to fingers lasts about 20-30 minutes blury left eye ). HPI:     3 weeks ago, patient was finishing dinner and suddenly developed heaviness of the L face and numbness of all fingers lasting for 45 mins with \"body feeling panic and claustrophobic\". 2 weeks ago, 3 AM just woke up with heaviness/ pressure in the L side of the head.    2 days ago, was working and whole L side got tingling with pressure with blurring of vision of L eye. (-) nausea (-) vomiting (-) photophobia (-) phonophobia. Lasting 90 mins. Patient went to urgent care where BP was okay. PU 27 weeks. JESIKA 2021    ROS   (-) fever  (-) rash  All other systems reviewed and are negative    Past Medical Hx  Past Medical History:   Diagnosis Date    Anxiety     Sepsis ([facility] [address] 2018       Social Hx  Social History     Socioeconomic History    Marital status:      Spouse name: Not on file    Number of children: Not on file    Years of education: Not on file    Highest education level: Not on file   Tobacco Use    Smoking status: Former Smoker     Quit date: 3/20/2020     Years since quittin.0    Smokeless tobacco: Never Used   Substance and Sexual Activity    Alcohol use:  Yes     Alcohol/week: 0.0 - 1.0 standard drinks    Drug use: No    Sexual activity: Yes     Partners: Male     Birth control/protection: Pill       Family Hx  Family History   Problem Relation Age of Onset    Psychiatric Disorder Mother         severe depression, substance abuse    Hypertension Mother     Alcohol abuse Mother     Hypertension Father     Heart Disease Father         MI age 52    Psychiatric Disorder Sister         severe anxiety and depression    Psychiatric Disorder Brother     Heart Disease Paternal Grandmother 28        Fatal MI age 34   Migraines - mother      ALLERGIES  No Known Allergies    CURRENT MEDS  Current Outpatient Medications   Medication Sig Dispense Refill    aspirin delayed-release 81 mg tablet Adult Low Dose Aspirin 81 mg tablet,delayed release   Take 1 tablet every day by oral route.  levothyroxine (SYNTHROID) 112 mcg tablet levothyroxine 112 mcg tablet   TAKE 1 TABLET BY MOUTH EVERY DAY      lidocaine (XYLOCAINE) 2 % solution Take 15 mL by mouth as needed for Pain. 1 Bottle 1    levothyroxine (SYNTHROID) 75 mcg tablet Take 1 Tab by mouth Daily (before breakfast). 30 Tab 11    JUNEL FE 1/20, 28, 1 mg-20 mcg (21)/75 mg (7) tab TAKE 1 TABLET BY MOUTH EVERY DAY  12           PREVIOUS WORKUP: (reviewed)  IMAGING:    CT Results (recent):  Results from Hospital Encounter encounter on 04/01/09   CT PELVIS W/ CONTRAST    Narrative Final Report         ICD Codes / Adm. Diagnosis:    /   ABD. PAIN  Examination:  PELVIS W CONTRAST - CT  - [id number] - Apr 1 2009  7:37PM    Accession No:  [id number]  Reason:  REASON: ABDOMINAL PAIN      REPORT:  Examination: CT of the abdomen and pelvis. 100 cc Optiray- 350  and oral   contrast were given. Images were obtained through the abdomen and pelvis. Coronal reformats were performed by the technologist.      Comparisons: None. Findings: There are no focal opacities at the lung bases. Heart size is   normal. The liver is normal in size without focal lesion. The spleen is   normal in appearance. No gallstones. No ductal dilatation. The pancreas is   not enlarged or inflamed. Adrenal glands are normal in size. No enhancing   renal mass. No stones. No hydronephrosis. There is no evidence of bowel obstruction.  No focal inflammatory process or   abscess collection is demonstrated. The appendix is enlarged and mildly   inflamed. There does appear to be a small appendicolith within the   appendix. .     There is no evidence retroperitoneal adenopathy. There is no free fluid or   free air. Examination of the pelvis demonstrates no mass lesion. Bladder is   unremarkable. Uterus is normal for age    Examination of the bone windows demonstrates no bony destructive change. IMPRESSION:    Abdomen:  1. No acute abnormality    Pelvis:  1. Acute appendicitis     Findings were relayed to the emergency department at the time of   interpretation    459            Interpreting/Reading Doctor: Jax Cruz ([id number])  Transcribed: n/a on 04/01/2009  Approved: Jax Cruz ([id number])  04/01/2009             Distribution:  Attending Doctor: Asaf Campbell Doctor: Alexandra Bauer            MRI Results (recent):  No results found for this or any previous visit. IR Results (recent):  No results found for this or any previous visit. VAS/US Results (recent):  No results found for this or any previous visit. LABS (reviewed)  Results for orders placed or performed in visit on 06/16/20   AMB POC RAPID STREP A   Result Value Ref Range    VALID INTERNAL CONTROL POC Yes     Group A Strep Ag Negative Negative       Physical Exam:     Visit Vitals  /68 (BP 1 Location: Left upper arm, BP Patient Position: Sitting, BP Cuff Size: Large adult long)   Pulse 99   Temp 96.8 °F (36 °C) (Temporal)   Resp 16   Ht 5' 6\" (1.676 m)   Wt 104.7 kg (230 lb 12.8 oz)   SpO2 100%   BMI 37.25 kg/m²     General:  Alert, cooperative, no distress. Head:  Normocephalic, without obvious abnormality, atraumatic. Eyes:  Conjunctivae/corneas clear. Lungs:  Heart:   Non labored breathing  Regular rate and rhythm, no carotid bruits   Abdomen:   Soft, non-distended   Extremities: Extremities normal, atraumatic, no cyanosis or edema.    Pulses: 2+ and symmetric all extremities. Skin: Skin color, texture, turgor normal. No rashes or lesions. Neurologic Exam     Gen: Attention normal (+) pregnant             Language: naming, repetition, fluency normal             Memory: intact recent and remote memory  Cranial Nerves:  I: smell Not tested   II: visual fields Full to confrontation   II: pupils Equal, round, reactive to light   II: optic disc No papilledema   III,VII: ptosis none   III,IV,VI: extraocular muscles  Full ROM   V: mastication normal   V: facial light touch sensation  normal   VII: facial muscle function   symmetric   VIII: hearing symmetric   IX: soft palate elevation  normal   XI: trapezius strength  5/5   XI: sternocleidomastoid strength 5/5   XI: neck flexion strength  5/5   XII: tongue  midline     Motor: normal bulk and tone, no tremor              Strength: 5/5 all four extremities  Sensory: intact to LT, PP, vibration, and JPS  Reflexes: 2+ throughout; Down going toes  Coordination: Good FTN and HTS  Gait: normal gait including tandem            Impression:     Laurence Ding is a 31 y.o. female who  has a past medical history of Anxiety and Sepsis ([facility] [address] (09/2018). who 3 weeks ago, was finishing dinner and suddenly developed heaviness of the L face and numbness of all fingers lasting for 45 mins with \"body feeling panic and claustrophobic\". 2 weeks ago, 3 AM just woke up with heaviness/ pressure in the L side of the head. 2 days ago, was working and whole L side got tingling with pressure with blurring of vision of L eye. (-) nausea (-) vomiting (-) photophobia (-) phonophobia. Lasting 90 mins. Patient went to urgent care where BP was okay. (+) PU 27 weeks. JESIKA July 1, 2021    Considerations differentials include migraine (pressure and visual disturbance), related to hormonal changes and anxiety/panic attacks. RECOMMENDATIONS  1. I had a long discussion with patient. Discussed diagnosis, prognosis, pathophysiology and available treatment.   All questions were answered. 2. Discussed because she is pregnant, what we can do for her is limited  3. Discussed option of doing MRI brain, but since there is no red flag on her neurological exam and episodes are transient, will hold off for now  4. Advise to go to ER if patient experience worsening episodes      Follow-up and Dispositions    · Return if symptoms worsen or fail to improve. Thank you for the consultation      Nadia Rand MD  Diplomate, American Board of Psychiatry and Neurology  Diplomate, Neuromuscular Medicine  Diplomate, American Board of Electrodiagnostic Medicine        CC:  Denise Covington MD  Fax: [phone]

## 2021-04-02 NOTE — LETTER
4/2/2021 Patient: Yulissa Griffiths YOB: 1990 Date of Visit: 4/2/2021 Emiliano Tafoya MD 
[id number] [id number] [address] [employer] Dear Emiliano Tafoya MD, Thank you for referring Ms. Yulissa Griffiths to [facility] for evaluation. My notes for this consultation are attached. If you have questions, please do not hesitate to call me. I look forward to following your patient along with you. Sincerely, Deidra Cordova MD

## 2021-06-07 LAB — GRBS, EXTERNAL: NEGATIVE

## 2021-06-21 ENCOUNTER — TRANSCRIBE ORDER (OUTPATIENT)
Dept: REGISTRATION | Age: 31
End: 2021-06-21

## 2021-06-21 ENCOUNTER — HOSPITAL ENCOUNTER (OUTPATIENT)
Dept: LAB | Age: 31
Discharge: HOME | End: 2021-06-21
Payer: COMMERCIAL

## 2021-06-21 DIAGNOSIS — Z01.812 ENCOUNTER FOR PREOPERATIVE SCREENING LABORATORY TESTING FOR COVID-19 VIRUS: ICD-10-CM

## 2021-06-21 DIAGNOSIS — Z20.822 ENCOUNTER FOR PREOPERATIVE SCREENING LABORATORY TESTING FOR COVID-19 VIRUS: Primary | ICD-10-CM

## 2021-06-21 DIAGNOSIS — Z01.812 ENCOUNTER FOR PREOPERATIVE SCREENING LABORATORY TESTING FOR COVID-19 VIRUS: Primary | ICD-10-CM

## 2021-06-21 DIAGNOSIS — Z20.822 ENCOUNTER FOR PREOPERATIVE SCREENING LABORATORY TESTING FOR COVID-19 VIRUS: ICD-10-CM

## 2021-06-21 PROCEDURE — U0005 INFEC AGEN DETEC AMPLI PROBE: HCPCS

## 2021-06-22 LAB
SARS-COV-2, XPLCVT: NOT DETECTED
SOURCE, COVRS: NORMAL

## 2021-06-24 NOTE — H&P
Print      Patient  Name MAISHA Parsons ID# [id number] Appt. Date/Time 2021 11:01AM    1990 Service Dept. [facility]_Hospital   Provider Iqra Gillespie MD   Insurance Med Primary: [facility])  Insurance # : [id number]  Policy/Group # : [id number]  Employer Name : [employer] [address]  Prescription: [address] - Member is eligible. details   Patient's Care Team  Primary Care Provider: [facility] MD: [address], [facility], [address] [facility], Ph [phone], Fax [phone] NPI: [id number]  OB/GYN: Chichi Jackson MD (VIRTUAL VISITS AVAILABLE): [address] [facility] [address], [facility], [address] [place] [address], Ph [phone], Fax [phone] NPI: [id number]  Patient's Pharmacies  Brittney Lyerly #[id number] [facility]): [address] [place] [address], [facility], Ph [phone], Fax [phone]  Allergies  Allergies not reviewed (last reviewed 2020)     NKDA   Medications  Medications not reviewed (last reviewed 2021)     butalbital-acetaminophen-caffeine 50 mg-325 mg-40 mg tablet  TAKE 1 TABLET BY ORAL ROUTE EVERY 4-6 HOURS prn severe migraine 21   entered    Iron (ferrous sulfate) 325 mg (65 mg iron) tablet  Take 1 tablet(s) every day by oral route. , start 2021   started Shobha [place]   levothyroxine 112 mcg tablet  TAKE 1 TABLET BY MOUTH EVERY DAY 21   renewed Dionne La MD   Prenatal + DHA 20   entered Farrel Lava   Vaccines  Vaccines not reviewed (last reviewed 2020)    Vaccine Type Date Amt. Route Site [address] Lot # Mfr. Exp.   Date Date  on VIS VIS  Given Vaccinator   Diphtheria, Tetanus, Pertussis   Tdap 21 0.5 mL Intramuscular Deltoid, Right  [id number] [address] Suellen Howell                                                     HPV   HPV, quadrivalent 16                                                               HPV, quadrivalent 14 HPV, quadrivalent 14                                                               Problems  Reviewed Problems     Obesity - Onset: 2018 - Entered By: Sheila Jones LPN - Team 2 WSTSigned By: Serena Carmona MD Description: Obesity (BMI > 29.99) code: 278.00   Oral contraception - Onset: 2017 - Entered By: Serena Carmona MDSigned By: Serena Carmona MD Description: Oral contraceptive pills follow up code: V25.41    2021-10am [facility] L&D/primary section/Vero-on Hunter tidwell asked  Family History  Discussed Family History    Father - Cerebrovascular accident     - Hypertensive disorder     - Hyperlipidemia   Mother - Fibromyalgia   Social History  Discussed Social History  OB/GYN Social History  Chewing tobacco: none  Tobacco Smoking Status: Former smoker  Non-smoker  Smokeless Tobacco Status: Never used smokeless tobacco  Tobacco-years of use: 0  E-cigarette/Vape Status: Never used electronic cigarettes  Most Recent Tobacco Use Screenin/10/2019  Marital status:   Number of children: 0  Are you working: Yes  Occupation:   How often do you have a DRINK containing ALCOHOL?: Monthly or less (Notes: no current use-pregnant)  Illicit drugs: no  Have you recently (within the last 12 weeks, or during a current pregnancy) traveled to or lived in a Zika-affected area?: N  Is blood transfusion acceptable in an emergency?: Y  Surgical History  Reviewed Surgical History     Appendectomy - 2009  GYN History  Reviewed GYN History  Date of LMP: 2020 (Notes: Pregnant -stopped bcp 2020). Frequency of Cycle (Q days): (Notes: regular). Duration of Flow (days): 5. Flow: Heavy. Menses Monthly: Y. Menstrual Cramps: mild. Date of Last Pap Smear: 2020 (Notes: 20 - NIL 20- CHETAN - abnormal (pt unsure of exact result) -performed at [facility]).   Abnormal Pap: Y.  Abnormal Pap Smear result: CHETAN. Any Treatment for Abnormal Pap?: N.  HPV Vaccine: Y. Sexually Active?: Y.  STIs/STDs: N.  Current Birth Control Method: Pregnant. Endometriosis: N. Fibroids: N. Infertility: N. Ovarian Cyst: N. PCOS: N.  Obstetric History  Reviewed Obstetric History    TOTAL FULL PRE AB. I AB. S ECTOPICS MULTIPLE LIVING   1       0   Pregnancy Problem List   Breech presentation   Depressive disorder - Discussed counseling vs meds Declines meds at this time going to try counseling   Anemia - on fe   Migraine - Saw Neurologist - Told silent migraines Per Neuro- MRI delayed til after pregnancy, advised can do now if indicated Also will contact if medications are recommended to review safety   Large for gestation age fetus - >99%ile at 25wk, 33wk growth 95%tile 32wks EFW > 99%tile Repeat at 36 weeks 92%tile   Maternal obesity complicating pregnancy, childbirth and the puerperium, antepartum - BMI 35, Rec Aspirin 81mg Early Glucola WNL   Primigravida   Hypothyroidism - TSH 1: 4.6 (synthroid increased to 112mcg qd) , 16wk: 1.35, 3rd trimester labs: TSH 2.260  Genetic Screening and Infection History  Medications (including Supplements, Vitamins, Herbs, OTC Drugs), Illicit/Recreational Drugs, Alcohol: Y, Levothyroxine 75mcg.   Patient's Age Will Be 28 Years Or Older At Estimated Date of Delivery: N  2824: Thalassemia ([facility], [facility], Mediterranean, Or  Background): MCV < 80: N  Neural Tube Defect (Meningomyelocele, Spina Bifida, Or Anencephaly): N  746: Congenital Heart Defect: N  7580: Down Syndrome: N  Tru-Sachs (eg, [facility], [facility]): N  Canavan Disease: N  282: Sickle Cell Disease Or Trait (): N  Hemophilia Or Other Blood Disorders: N  359: Muscular Dystrophy: N  2770: Cystic Fibrosis: N  3334: Jeffry's Chorea: N  319: Mental Retardation/Autism: N  If Yes, Was Person Tested For Fragile X?: N  Other Inherited Genetic Or Chromosomal Disorder: N  Maternal Metabolic Disorder (eg, Type 1 Diabetes, PKU): N  Patient Or [de-identified] Father Had A Child With Birth Defects Not Listed Above: N  Recurrent Pregnancy Loss, Or A Stillbirth: N  If Yes, Agent(s) And Strength/Dosage: N  Any Other Genetic History: N  Live With Someone With TB Or Exposed To TB: N  Patient Or Partner Has History Of Genital Herpes: N  Rash Or Viral Illness Since Last Menstrual Period: N  History Of STD, Gonorrhea, Chlamydia, HPV, Syphilis: N  Other Infection History: N  Bloom Syndrome: N  Gaucher Disease: N  Cori-Pick Disease: N  Fanconi Anemia: N  Familial Dysautonia: N  History of Chicken Pox: N  Bone/Skeletal Defects: N  Dwarfism: N  Developmental Delay: N  Learning Problems: N  Polycystic Kidney Disease: N  Marfan Syndrome: N  Galactosemia: N  Deafness/Blindness: N  Color Blindness: N  Hemochromatosis: N  Prior GBS-infected child: N  History of Hepatitis: N  History of HIV: N  Neurologic (brain/spine): N  Prenatal Flowsheet  Fundus Pres FHR FM PLS Cervix Exam BP Wt Edema Glucose Protein Leukocytes Nitrite Ketones Blood   11/05/2020   7 wks 3 days                                          Comments: chart prep: Patient unsure of hospital of delivery. Patient c/o nausea. Patient c/o abnormal vaginal discharge w/odor, first noticing about 1 week ago after intercourse. Patient denies any headaches, vomiting, vaginal bleeding or abnormal discharge. 11/09/2020   6 wks 4 days                           136       none neg       Comments: Urine culture collected. 11/09/2020   6 wks 4 days   [facility]                             112/68 219 lbs none         Comments: Rm 7: c/o severe nausea - only able to keep ginger ale down, not much else. Would like to discuss medication options. Denies bleeding. Declines flu vaccine today.  EDC by U/S today, collect prenatal labs, reviewed genetic testing options - considering NIPT, SMA/CF - undecided, reviewed prenatal care, education, nutrition, weight gain recommendations, exercise, hospitalist/laborist system   12/14/2020   11 wks 4 days   [facility]                             115/72 217 lbs          Comments: Reports nausea with occasional vomiting. Declines medication, will call if worsens. Headaches- discussed hydration and tylenol. Constipation- reviewed OTC medication and diet changes. V-scan confirms + FHTs, and movement. MT21 today. RTO in 4 weeks. 01/12/2021   15 wks 5 days   [email or web address]                         155    118/78 218 lbs none         Comments: Denies VB, LOF, VD, pain. General recommendations and precautions reviewed. All questions answered. rto NV has been slightly improving, but still present. C/o burning pain in right thigh and has moved to lower back. Insomnia due to pain. Sleeping and positioning recs given. Migraine has been pretty mod-severe for past 72 hours. Unrelieved with Tylenol. Rx given for Fioracet (to be used sparingly). Decl AFP today. 02/22/2021   21 wks 4 days   emcgaw                         148 Yes   102/74 224 lbs none         Comments: Anatomy scan today- final read pending. Needs repeat views. early gtts today. Nausea and vomiting resolved. Denies any other issues. RTO in 2 weeks   03/10/2021   23 wks 6 days   emcgaw                          Yes   122/68 226 lbs trace         Comments: US today for repeat anatomy, results pending. Reports sciatic pain on left- declines PT. Reports has two broken toes on left foot- encouraged to see orthopedics. No pain or bleeding. Active fetal movement. RTO 4 weeks for growth scan, Repeat glucola and TSH.   04/12/2021   28 wks 4 days   emcgaw                         149 Yes   120/70 232 lbs trace none trace       Comments: Glucola today. EFW 95%tile. Accepts tdap. In last month started having symptoms of head pressure, numbness and tingling bilaterally, and confusion that resolves after 15min -1 hour Occuring about once a wee.  saw neurologist a few weeks ago dx with silent migraines- was told to follow up with obgyn. Advised can get MRI if indicated, if medications are recommended we can check there safety. RTO 2 weeks Thyroid labs today   04/26/2021   30 wks 4 days   [email or web address]                       31 cm  148 Yes   120/68 236 lbs 1+ none neg       Comments: Rm 2: Denies VB/abnormal dc. Has not had any severe episodes of migraines. Spoke with Neurologist since last visit and was told all nml. Doing well, no complaints. Denies VB, LOF, VD, pain. General recommendations and precautions reviewed. All questions answered. Plans to breastfeed, desired circ. Peds list given. 05/10/2021   32 wks 4 days   emcgaw                         146 Yes   112/80 239 lbs trace none neg       Comments: EFW > 99%tile. Breech. BPP 8/8. Active fetal movement. Occasional round ligament pain. Trouble sleeping for back pain. notes twice daily ringing in her ears . Increase in depression in the last month- no counselor or psych. Declines medication- took in past tried multiple different without improvement. Will start with counseling, recs given. does not have a good support system at home. Occasional thoughts of running away but no SI. RTO 2 weeks. 05/24/2021   34 wks 4 days   emcgaw                         153 Yes   130/78 242 lbs 1+         Comments: Patient started experiencing nausea/ rare vomiting last week along with a loss of appetite. Suspect GERD , will add pepcid. Patient with intermittent low back pain and abdominal pain related to activity. Resolves with rest. Discussed hydration, belly band, and labor precuations. Patient denies any headaches, bleeding, or spotting. Growth scan next appt, confirm present. Discussed ECV if breech. 06/07/2021   36 wks 4 days   emcgaw                         160 Yes  0cm / 0% / -3 114/84 244 lbs trace none neg       Comments: EFW 92%tile BPP 8/8, BREECH. Discussed ECV vs C/S desires C/S. She desires to be done around 39 weeks.  nausea, hip and joint pain unbearable- states she can not walk at about 5pm. Declines physical therapy. Active fetal movement. No bleeding or contractions. Desires to come out of work now. Aware that will affect her FMLA.   06/14/2021   37 wks 4 days   emcgaw                         153 Yes   112/76 247 lbs trace         Comments: states she sleeps 2 hours a day due to back pain. Discussed positioning, unisom etc. Taking more naps now that not working which is helping. Active fetal movement. No contractions or bleeding. Scheduled for C/S at 39 weeks- discussed expectations and COVID testing next week. RTO for last appt new week   06/21/2021   38 wks 4 days   emcgaw                         153 Yes   122/68 252 lbs 2+         Comments: Still with nausea/vomiting occasionally. Swelling in feet and tingling sensation in finger tips. Active fetal movement. No contractions and no bleeding. Scheduled for C/S later this week. 06/24/2021     [email or web address]                                        OB Lab Results    Result Value Ref.  Range Date Collected Date Reviewed Entered By Note   Initial Labs             Blood Type A  11/09/2020 11/11/2020 [facility]        D (Rh) Type Positive  11/09/2020 11/11/2020 [facility]        Antibody Screen Negative NEGATIVE 11/09/2020 11/11/2020 [facility]        Antibody Screen Negative NEGATIVE 04/12/2021 04/14/2021 [facility]        HCT - Initial 40.3 % 34.0-46.6 11/09/2020 11/11/2020 [facility]        HGB - Initial 13.3 g/dL 11.1-15.9 11/09/2020 11/11/2020 [facility]        MCV - Initial 88 fL 79-97 11/09/2020 11/11/2020 [facility]        PLT - Initial 275 x10E3/uL 150-450 11/09/2020 11/11/2020 [facility]        VDRL - Initial Non Reactive NON REACTIVE 11/09/2020 11/11/2020 [facility]        Urine Culture/Screen No growth  11/09/2020 11/11/2020 [facility]        HBsAg Negative NEGATIVE 11/09/2020 11/11/2020 [facility]        HIV Counseling/Testing Non Reactive NON REACTIVE 11/09/2020 11/11/2020 [facility]        Chlamydia - Initial Negative NEGATIVE 11/09/2020 11/16/2020 [facility]        Gonorrhea - Initial Negative NEGATIVE 11/09/2020 11/16/2020 [facility]        Varicella             Rubella 9.82 index IMMUNE >0.99 11/09/2020 11/11/2020 [facility]    Optional Labs             HGB Electrophoresis             PPD/Quanta             Pap Test Normal  11/09/2020 11/17/2020 [email or web address]        Cystic Fibrosis             HPV   11/09/2020 11/16/2020 [facility]             Familial Dysautonomia             Genetic Screening Tests             NST             TSH *4.680 uIU/mL 0.450-4.500 11/09/2020 11/11/2020 [facility]        TSH   01/12/2021 01/14/2021 [email or web address]        TSH 2.260 uIU/mL 0.450-4.500 04/12/2021 04/14/2021 [email or web address]        Drug screen             HCV Ab   11/09/2020 11/11/2020 [facility]        HCV RNA             Urinalysis             Rhogam Injection         8-20 Week Labs             Ultrasound - Initial             1st Trimester Aneuploidy Risk Assessment             MSAFP/Multiple Markers             2nd Trimester Serum Screening             Amnio/CVS             Karyotype             Amniotic Fluid (AFP)         24-28 Week Labs             HCT - 24-28 Weeks 32.7 % 34.0-46.6 04/12/2021 04/14/2021 em[facility]aw        HGB - 24-28 Weeks 10.9 g/dL 11.1-15.9 04/12/2021 04/14/2021 [facility]        MCV - 24-28 Weeks             PLT - 24-28 Weeks 226 x10E3/uL 150-450 04/12/2021 04/14/2021 [facility]aw        Diabetes Screen 96 mg/dL  02/22/2021 02/23/2021 emcgaw        Diabetes Screen 120 mg/dL  04/12/2021 04/14/2021 emcgaw        GTT (If Screen Abnormal)             D (Rh) Antibody Screen         32-36 Week Labs             HCT - 32-36 Weeks             HGB - 32-36 Weeks             MCV - 32-36 Weeks             PLT - 32-36 Weeks             Ultrasound - 32-36 Weeks             HIV (When Indicated)             VDRL - 32-36 Weeks Non Reactive NON REACTIVE 04/12/2021 04/14/2021 [facility]        Gonorrhea - 32-36 Weeks             Chlamydia - 32-36 Weeks             Depression screening (when indicated)         35-37 Week Labs             Group B Strep Negative NEGATIVE 06/07/2021 06/09/2021 em[facility]aw        Resistance testing if penicillin allergic         Other Labs             Other         *Asterisk denotes an abnormal result         Past Medical History  Discussed Past Medical History  Abnormal Pap Smear: Y - 9/30/2020 with non [facility] provider  Hypothyroidism: Y  Thyroid Disease: Y  HPI  H&P for scheduled CS for    pregnancy c/b hypothyroidism, obesity, LGA, anemia, depression, migraines. ROS  ROS as noted in the HPI  Physical Exam  Patient is a 37-year-old female. Constitutional: General Appearance: well developed and nourished and pleasant. Level of Distress: no acute distress. Ambulation: ambulating normally. Head: Head: normocephalic. Eyes: Extraocular Movements extraocular movements intact. Ears, Nose, Mouth, Throat: Ears normal hearing. Nose: no external nose lesions. Lungs / Chest: Respiratory effort: unlabored. Cardiovascular: Extremities: no cyanosis. Abdomen: Liver: gravid. Neurologic: Gait and Station: normal gait. Sensation: grossly intact. Motor: grossly intact. Mental Status Exam: Orientation oriented to person, place, and time. Assessment / Plan  1. Depressive disorder -  Discussed counseling vs meds  Declined meds, going to try counseling  O99.343: Other mental disorders complicating pregnancy, third trimester    2. Migraine -  Saw Neurologist - Told silent migraines Per Neuro- MRI delayed til after pregnancy, advised can do now if indicated Also will contact if medications are recommended to review safety  G43.909: Migraine, unspecified, not intractable, without status migrainosus    3. Anemia -  Hgb 10.9, on fe  C10.304: Anemia complicating pregnancy, third trimester    4.  Large for gestation age fetus -  >99%ile at 25wk, 33wk growth 95%tile 32wks EFW > 99%tile Repeat at 36 weeks 92%tile  O36.63X1: Maternal care for excessive fetal growth, third trimester, fetus 1    5. Maternal obesity complicating pregnancy, childbirth and the puerperium, antepartum -  BMI 35  M58.176: Obesity complicating pregnancy, third trimester    6. Hypothyroidism -  TSH 1: 4.6 (synthroid increased to 112mcg qd) , 16wk: 1.35, 3rd trimester labs: TSH 2.260  O99.283: Endocrine, nutritional and metabolic diseases complicating pregnancy, third trimester    7. Breech presentation -  patient has been counseeld re ECV vs CS and prefers CS  O32. 1XX1: Maternal care for breech presentation, fetus 1    6. Gestation period, 39 weeks  Z3A.39: 39 weeks gestation of pregnancy      Return to Office   for Return OB at [address] [place] [address] on or around 06/24/2021  Robb Vaca MD for Surgery at [facility] () on 06/25/2021 at 10:00 AM   for Return OB at [address] [place] [address] on or around 07/01/2021  Encounter Sign-Off  Encounter signed-off by Patrick Bauman MD, 06/24/2021. Encounter performed and documented by Patrick Bauman MD  Encounter reviewed & signed by Patrick Bauman MD on 06/24/2021 at 11:17am    There is not enough information to calculate an E&M code      Audit history   Date of Surgery Update:  Hannah Julien was seen and examined. History and physical has been reviewed. The patient has been examined.  There have been no significant clinical changes since the completion of the originally dated History and Physical.  - BSUS done and confirms breech presentation  - consent reviewed, signed and questions answered    Signed By: Tahira Davila MD     June 25, 2021 9:49 AM

## 2021-06-25 ENCOUNTER — ANESTHESIA (OUTPATIENT)
Dept: LABOR AND DELIVERY | Age: 31
End: 2021-06-25
Payer: COMMERCIAL

## 2021-06-25 ENCOUNTER — HOSPITAL ENCOUNTER (INPATIENT)
Age: 31
LOS: 2 days | Discharge: HOME | End: 2021-06-27
Attending: OBSTETRICS & GYNECOLOGY | Admitting: OBSTETRICS & GYNECOLOGY
Payer: COMMERCIAL

## 2021-06-25 ENCOUNTER — ANESTHESIA EVENT (OUTPATIENT)
Dept: LABOR AND DELIVERY | Age: 31
End: 2021-06-25
Payer: COMMERCIAL

## 2021-06-25 PROBLEM — Z34.90 TERM PREGNANCY: Status: ACTIVE | Noted: 2021-06-25

## 2021-06-25 LAB
ABO + RH BLD: NORMAL
BLOOD GROUP ANTIBODIES SERPL: NORMAL
ERYTHROCYTE [DISTWIDTH] IN BLOOD BY AUTOMATED COUNT: 14.9 % (ref 11.5–14.5)
HCT VFR BLD AUTO: 32.4 % (ref 35–47)
HCT VFR BLD AUTO: 37.5 % (ref 35–47)
HGB BLD-MCNC: 10.1 G/DL (ref 11.5–16)
HGB BLD-MCNC: 12.1 G/DL (ref 11.5–16)
MCH RBC QN AUTO: 27.5 PG (ref 26–34)
MCHC RBC AUTO-ENTMCNC: 32.3 G/DL (ref 30–36.5)
MCV RBC AUTO: 85.2 FL (ref 80–99)
NRBC # BLD: 0 K/UL (ref 0–0.01)
NRBC BLD-RTO: 0 PER 100 WBC
PLATELET # BLD AUTO: 199 K/UL (ref 150–400)
PMV BLD AUTO: 11.9 FL (ref 8.9–12.9)
RBC # BLD AUTO: 4.4 M/UL (ref 3.8–5.2)
SPECIMEN EXP DATE BLD: NORMAL
WBC # BLD AUTO: 8.9 K/UL (ref 3.6–11)

## 2021-06-25 PROCEDURE — 74011250636 HC RX REV CODE- 250/636: Performed by: ANESTHESIOLOGY

## 2021-06-25 PROCEDURE — 77030011220 HC DEV ELECSURG COVD -B

## 2021-06-25 PROCEDURE — 2709999900 HC NON-CHARGEABLE SUPPLY

## 2021-06-25 PROCEDURE — 74011250636 HC RX REV CODE- 250/636: Performed by: OBSTETRICS & GYNECOLOGY

## 2021-06-25 PROCEDURE — 76010000392 HC C SECN EA ADDL 0.5 HR: Performed by: OBSTETRICS & GYNECOLOGY

## 2021-06-25 PROCEDURE — 65410000002 HC RM PRIVATE OB

## 2021-06-25 PROCEDURE — 85018 HEMOGLOBIN: CPT

## 2021-06-25 PROCEDURE — 74011000250 HC RX REV CODE- 250: Performed by: ANESTHESIOLOGY

## 2021-06-25 PROCEDURE — 86901 BLOOD TYPING SEROLOGIC RH(D): CPT

## 2021-06-25 PROCEDURE — 85027 COMPLETE CBC AUTOMATED: CPT

## 2021-06-25 PROCEDURE — 77030018831 HC SOL IRR H20 BAXT -A

## 2021-06-25 PROCEDURE — 77030041076 HC DRSG AG OPTICELL MDII -A

## 2021-06-25 PROCEDURE — 36415 COLL VENOUS BLD VENIPUNCTURE: CPT

## 2021-06-25 PROCEDURE — 77030018842 HC SOL IRR SOD CL 9% BAXT -A

## 2021-06-25 PROCEDURE — 74011000250 HC RX REV CODE- 250: Performed by: OBSTETRICS & GYNECOLOGY

## 2021-06-25 PROCEDURE — 77030007866 HC KT SPN ANES BBMI -B: Performed by: ANESTHESIOLOGY

## 2021-06-25 PROCEDURE — 75410000003 HC RECOV DEL/VAG/CSECN EA 0.5 HR: Performed by: OBSTETRICS & GYNECOLOGY

## 2021-06-25 PROCEDURE — 77030040361 HC SLV COMPR DVT MDII -B

## 2021-06-25 PROCEDURE — 77030031139 HC SUT VCRL2 J&J -A

## 2021-06-25 PROCEDURE — 76010000391 HC C SECN FIRST 1 HR: Performed by: OBSTETRICS & GYNECOLOGY

## 2021-06-25 PROCEDURE — 77030008459 HC STPLR SKN COOP -B

## 2021-06-25 PROCEDURE — 76060000078 HC EPIDURAL ANESTHESIA: Performed by: OBSTETRICS & GYNECOLOGY

## 2021-06-25 RX ORDER — BUPIVACAINE HYDROCHLORIDE 7.5 MG/ML
INJECTION, SOLUTION EPIDURAL; RETROBULBAR
Status: COMPLETED | OUTPATIENT
Start: 2021-06-25 | End: 2021-06-25

## 2021-06-25 RX ORDER — MORPHINE SULFATE 0.5 MG/ML
INJECTION, SOLUTION EPIDURAL; INTRATHECAL; INTRAVENOUS
Status: COMPLETED | OUTPATIENT
Start: 2021-06-25 | End: 2021-06-25

## 2021-06-25 RX ORDER — LEVOTHYROXINE SODIUM 112 UG/1
112 TABLET ORAL
Status: DISCONTINUED | OUTPATIENT
Start: 2021-06-26 | End: 2021-06-27 | Stop reason: HOSPADM

## 2021-06-25 RX ORDER — OXYTOCIN/RINGER'S LACTATE 30/500 ML
PLASTIC BAG, INJECTION (ML) INTRAVENOUS
Status: DISCONTINUED | OUTPATIENT
Start: 2021-06-25 | End: 2021-06-25 | Stop reason: HOSPADM

## 2021-06-25 RX ORDER — DIPHENHYDRAMINE HCL 25 MG
25 CAPSULE ORAL
COMMUNITY
End: 2021-06-27

## 2021-06-25 RX ORDER — DIPHENHYDRAMINE HYDROCHLORIDE 50 MG/ML
25 INJECTION, SOLUTION INTRAMUSCULAR; INTRAVENOUS
Status: DISCONTINUED | OUTPATIENT
Start: 2021-06-25 | End: 2021-06-27 | Stop reason: HOSPADM

## 2021-06-25 RX ORDER — MORPHINE SULFATE 0.5 MG/ML
INJECTION, SOLUTION EPIDURAL; INTRATHECAL; INTRAVENOUS AS NEEDED
Status: DISCONTINUED | OUTPATIENT
Start: 2021-06-25 | End: 2021-06-25 | Stop reason: HOSPADM

## 2021-06-25 RX ORDER — OXYTOCIN/RINGER'S LACTATE 30/500 ML
10 PLASTIC BAG, INJECTION (ML) INTRAVENOUS AS NEEDED
Status: DISCONTINUED | OUTPATIENT
Start: 2021-06-25 | End: 2021-06-27 | Stop reason: HOSPADM

## 2021-06-25 RX ORDER — MORPHINE SULFATE 10 MG/ML
6 INJECTION, SOLUTION INTRAMUSCULAR; INTRAVENOUS
Status: DISCONTINUED | OUTPATIENT
Start: 2021-06-25 | End: 2021-06-27 | Stop reason: HOSPADM

## 2021-06-25 RX ORDER — NALOXONE HYDROCHLORIDE 0.4 MG/ML
0.4 INJECTION, SOLUTION INTRAMUSCULAR; INTRAVENOUS; SUBCUTANEOUS AS NEEDED
Status: DISCONTINUED | OUTPATIENT
Start: 2021-06-25 | End: 2021-06-27 | Stop reason: HOSPADM

## 2021-06-25 RX ORDER — OXYTOCIN/RINGER'S LACTATE 30/500 ML
87.3 PLASTIC BAG, INJECTION (ML) INTRAVENOUS AS NEEDED
Status: DISCONTINUED | OUTPATIENT
Start: 2021-06-25 | End: 2021-06-27 | Stop reason: HOSPADM

## 2021-06-25 RX ORDER — ACETAMINOPHEN 325 MG/1
650 TABLET ORAL
COMMUNITY
End: 2021-06-27

## 2021-06-25 RX ORDER — OXYTOCIN/RINGER'S LACTATE 30/500 ML
PLASTIC BAG, INJECTION (ML) INTRAVENOUS
Status: DISCONTINUED | OUTPATIENT
Start: 2021-06-25 | End: 2021-06-25

## 2021-06-25 RX ORDER — ONDANSETRON 2 MG/ML
4 INJECTION INTRAMUSCULAR; INTRAVENOUS
Status: DISCONTINUED | OUTPATIENT
Start: 2021-06-25 | End: 2021-06-27 | Stop reason: HOSPADM

## 2021-06-25 RX ORDER — MORPHINE SULFATE 10 MG/ML
10 INJECTION, SOLUTION INTRAMUSCULAR; INTRAVENOUS
Status: DISCONTINUED | OUTPATIENT
Start: 2021-06-25 | End: 2021-06-27 | Stop reason: HOSPADM

## 2021-06-25 RX ORDER — SODIUM CHLORIDE, SODIUM LACTATE, POTASSIUM CHLORIDE, CALCIUM CHLORIDE 600; 310; 30; 20 MG/100ML; MG/100ML; MG/100ML; MG/100ML
INJECTION, SOLUTION INTRAVENOUS
Status: DISCONTINUED | OUTPATIENT
Start: 2021-06-25 | End: 2021-06-25 | Stop reason: HOSPADM

## 2021-06-25 RX ORDER — SODIUM CHLORIDE, SODIUM LACTATE, POTASSIUM CHLORIDE, CALCIUM CHLORIDE 600; 310; 30; 20 MG/100ML; MG/100ML; MG/100ML; MG/100ML
125 INJECTION, SOLUTION INTRAVENOUS CONTINUOUS
Status: DISCONTINUED | OUTPATIENT
Start: 2021-06-25 | End: 2021-06-27 | Stop reason: HOSPADM

## 2021-06-25 RX ORDER — EPHEDRINE SULFATE/0.9% NACL/PF 50 MG/5 ML
SYRINGE (ML) INTRAVENOUS AS NEEDED
Status: DISCONTINUED | OUTPATIENT
Start: 2021-06-25 | End: 2021-06-25 | Stop reason: HOSPADM

## 2021-06-25 RX ORDER — KETOROLAC TROMETHAMINE 30 MG/ML
30 INJECTION, SOLUTION INTRAMUSCULAR; INTRAVENOUS
Status: DISPENSED | OUTPATIENT
Start: 2021-06-25 | End: 2021-06-26

## 2021-06-25 RX ORDER — ONDANSETRON 2 MG/ML
INJECTION INTRAMUSCULAR; INTRAVENOUS AS NEEDED
Status: DISCONTINUED | OUTPATIENT
Start: 2021-06-25 | End: 2021-06-25 | Stop reason: HOSPADM

## 2021-06-25 RX ADMIN — PHENYLEPHRINE HYDROCHLORIDE 20 MCG/MIN: 10 INJECTION INTRAVENOUS at 10:29

## 2021-06-25 RX ADMIN — MORPHINE SULFATE 1.3 MG: 0.5 INJECTION, SOLUTION EPIDURAL; INTRATHECAL; INTRAVENOUS at 11:00

## 2021-06-25 RX ADMIN — OXYTOCIN 909 ML/HR: 10 INJECTION, SOLUTION INTRAMUSCULAR; INTRAVENOUS at 10:51

## 2021-06-25 RX ADMIN — SODIUM CHLORIDE, POTASSIUM CHLORIDE, SODIUM LACTATE AND CALCIUM CHLORIDE 125 ML/HR: 600; 310; 30; 20 INJECTION, SOLUTION INTRAVENOUS at 12:00

## 2021-06-25 RX ADMIN — MORPHINE SULFATE 0.2 MG: 0.5 INJECTION, SOLUTION EPIDURAL; INTRATHECAL; INTRAVENOUS at 10:27

## 2021-06-25 RX ADMIN — Medication 10 MG: at 10:40

## 2021-06-25 RX ADMIN — Medication 10 MG: at 11:26

## 2021-06-25 RX ADMIN — ONDANSETRON HYDROCHLORIDE 4 MG: 2 INJECTION, SOLUTION INTRAMUSCULAR; INTRAVENOUS at 10:34

## 2021-06-25 RX ADMIN — SODIUM CHLORIDE, POTASSIUM CHLORIDE, SODIUM LACTATE AND CALCIUM CHLORIDE 1000 ML: 600; 310; 30; 20 INJECTION, SOLUTION INTRAVENOUS at 08:10

## 2021-06-25 RX ADMIN — CEFAZOLIN 2 G: 1 INJECTION, POWDER, FOR SOLUTION INTRAMUSCULAR; INTRAVENOUS at 10:10

## 2021-06-25 RX ADMIN — Medication 5 MG: at 10:34

## 2021-06-25 RX ADMIN — BUPIVACAINE HYDROCHLORIDE 12 MG: 7.5 INJECTION, SOLUTION EPIDURAL; RETROBULBAR at 10:27

## 2021-06-25 RX ADMIN — MORPHINE SULFATE 0.5 MG: 0.5 INJECTION, SOLUTION EPIDURAL; INTRATHECAL; INTRAVENOUS at 11:23

## 2021-06-25 RX ADMIN — SODIUM CHLORIDE, POTASSIUM CHLORIDE, SODIUM LACTATE AND CALCIUM CHLORIDE: 600; 310; 30; 20 INJECTION, SOLUTION INTRAVENOUS at 10:20

## 2021-06-25 RX ADMIN — MORPHINE SULFATE 1 MG: 0.5 INJECTION, SOLUTION EPIDURAL; INTRATHECAL; INTRAVENOUS at 11:08

## 2021-06-25 RX ADMIN — SODIUM CHLORIDE, POTASSIUM CHLORIDE, SODIUM LACTATE AND CALCIUM CHLORIDE 1000 ML: 600; 310; 30; 20 INJECTION, SOLUTION INTRAVENOUS at 09:30

## 2021-06-25 RX ADMIN — ONDANSETRON 4 MG: 2 INJECTION INTRAMUSCULAR; INTRAVENOUS at 18:46

## 2021-06-25 NOTE — ANESTHESIA POSTPROCEDURE EVALUATION
Post-Anesthesia Evaluation and Assessment    Patient: Moises January MRN: [id number]  SSN: [id number]    YOB: 1990  Age: 31 y.o. Sex: female      I have evaluated the patient and they are stable and ready for discharge from the PACU. Cardiovascular Function/Vital Signs  Visit Vitals  /68 (BP 1 Location: Left arm, BP Patient Position: At rest)   Pulse 84   Temp 36.8 °C (98.3 °F)   Resp 16   Ht 5' 5\" (1.651 m)   Wt 114.3 kg (252 lb)   SpO2 97%   Breastfeeding No   BMI 41.93 kg/m²       Patient is status post Spinal anesthesia for Procedure(s) with comments:   SECTION - EDC 21. Nausea/Vomiting: None    Postoperative hydration reviewed and adequate. Pain:  Pain Scale 1: Numeric (0 - 10) (21 1138)  Pain Intensity 1: 3 (21 1138)   Managed    Neurological Status: At baseline    Mental Status, Level of Consciousness: Alert and  oriented to person, place, and time    Pulmonary Status:       Adequate oxygenation and airway patent    Complications related to anesthesia: None    Post-anesthesia assessment completed. No concerns    Signed By: Rowdy Meier MD     2021              Procedure(s):   SECTION. spinal, MAC    <BSHSIANPOST>    INITIAL Post-op Vital signs:   Vitals Value Taken Time   /57 21 1251   Temp 36.8 °C (98.3 °F) 21 1138   Pulse 96 21 1251   Resp 16 21 1238   SpO2 100 % 21 1258   Vitals shown include unvalidated device data.

## 2021-06-25 NOTE — ANESTHESIA PROCEDURE NOTES
Spinal Block    Start time: 6/25/2021 10:27 AM  End time: 6/25/2021 10:27 AM  Performed by: Aminta Jolly CRNA  Authorized by: Dennise Stewart MD     Pre-procedure:   Indications: primary anesthetic  Preanesthetic Checklist: patient identified, risks and benefits discussed, anesthesia consent, site marked, patient being monitored and timeout performed    Timeout Time: 10:27 EDT          Spinal Block:   Patient Position:  Seated  Prep Region:  Lumbar  Prep: chlorhexidine      Location:  L3-4  Technique:  Single shot    Local Dose (mL):  1.6    Needle:   Needle Type:  Pencan  Needle Gauge:  24 G  Attempts:  1      Events: CSF confirmed, no blood with aspiration and no paresthesia        Assessment:  Insertion:  Uncomplicated  Patient tolerance:  Patient tolerated the procedure well with no immediate complications

## 2021-06-25 NOTE — OP NOTES
Operative Note    Name: Yulissa Griffiths   Medical Record Number: [id number]   YOB: 1990  Today's Date: 2021      Pre-operative Diagnosis: , BREECH    Post-operative Diagnosis: same but delivered    Operation: low transverse  section Procedure(s):   SECTION    Surgeon(s):  MD Saroj Russell MD    Anesthesia: Spinal    Prophylactic Antibiotics: Ancef  DVT Prophylaxis: Sequential Compression Devices         Fetal Description: richey     Birth Information:   Information for the patient's :  Jazmin Parker [[id number]]   Delivery of a   male infant on 2021 at 10:49 AM. Apgars were 8  and 9 . Umbilical Cord:       Umbilical Cord Events: Nuchal Cord Without Compressions     Placenta: Manual Removal removal with Normal appearance. Amniotic Fluid Volume:        Amniotic Fluid Description:           Umbilical Cord: Nuchal Cord x  2    Placenta:  expressed    Estimated Blood Loss (ml):  700    Specimens: none    Implants: none           Complications:  none    Procedure Detail:      After proper patient identification and consent, the patient was taken to the operating room, where spinal anesthesia was administered and found to be adequate. Marcus catheter had been placed using sterile technique. The patient was prepped and draped in the normal sterile fashion. The abdomen was entered using the Pfannenstiel technique. The peritoneum was entered well superior to the bladder without any apparent injury. The bladder flap was created without difficulty. A low transverse uterine incision was made with the scalpel and extended. Amniotomy was performed and the fluid was clear. The babys breech was then delivered atraumatically, and the rest of body followed w standard breech maneuvers. The nose and mouth were suctioned. The cord was clamped and cut and the baby was handed off to Nursing staff in attendance.  Placenta was then removed from the uterus. The uterus was curettaged with a moist lap pad and cleared of all clots and debris. The uterine incision was closed with 0 Vicryl  in running locking fashion with good hemostasis assured. A second imbricating layer was placed. Good hemostasis was again reassured and the uterus was returned to the abdomen. The pelvis was irrigated with warm normal saline and good hemostasis was again reassured throughout. The fascia was closed with 0 Vicryl in a running fashion. Good hemostasis was assured. Subcutaneous space reapproximated w interrupted 0vicryl. The skin was closed with a insorb closure. The patient tolerated the procedure well. Sponge, lap, and needle counts were correct times three and the patient and baby were taken to recovery/postpartum room in stable condition.     Ligia Collins MD  June 25, 2021  11:39 AM

## 2021-06-25 NOTE — L&D DELIVERY NOTE
Delivery Summary  Patient: Lori Gilmore             Circumcision:   desires  Additional Delivery Comments - scheduled CS for breech      Information for the patient's :  [facility] [[id number]]       Labor Events:    Labor: No    Steroids: None   Cervical Ripening Date/Time:       Cervical Ripening Type: None   Antibiotics During Labor: Yes   Rupture Identifier:      Rupture Date/Time:       Rupture Type:     Amniotic Fluid Volume:      Amniotic Fluid Description:      Amniotic Fluid Odor:      Induction: None       Induction Date/Time:        Indications for Induction:      Augmentation: None   Augmentation Date/Time:      Indications for Augmentation:     Labor complications: None       Additional complications:        Delivery Events:  Indications For Episiotomy:     Episiotomy:     Perineal Laceration(s):     Repaired:     Periurethral Laceration Location:      Repaired:     Labial Laceration Location:     Repaired:     Sulcal Laceration Location:     Repaired:     Vaginal Laceration Location:     Repaired:     Cervical Laceration Location:     Repaired:     Repair Suture:     Number of Repair Packets:     Estimated Blood Loss (ml):  ml   Quantitative Blood Loss (ml)                Delivery Date: 2021    Delivery Time: 10:49 AM  Delivery Type: , Low Transverse  Sex:  Male    Gestational Age: 36w3d   Delivery Clinician:  Kassie Cohen  Living Status: Living   Delivery Location: L&D OR # 2          APGARS  One minute Five minutes Ten minutes   Skin color: 0   1        Heart rate: 2   2        Grimace: 2   2        Muscle tone: 2   2        Breathin   2        Totals: 8   9            Presentation: Breech    Position:        Resuscitation Method:  Suctioning-bulb;Suctioning-deep; Tactile Stimulation     Meconium Stained: None      Cord Information:    Complications: Nuchal Cord Without Compressions  Cord around:    Delayed cord clamping?  Yes  Cord clamped date/time:2021 10:50 AM  Disposition of Cord Blood: Discard    Blood Gases Sent?: No    Placenta:  Date/Time: 2021 10:50 AM  Removal: Manual Removal      Appearance: Normal      Measurements:  Birth Weight: 4.355 kg      Birth Length: 50.8 cm      Head Circumference: 36.5 cm      Chest Circumference:       Abdominal Girth: 35 cm    Other Providers:   RICKI ROBERTS;SEAVER, DARLA J;TONYA CAZARES;SERA MAHAN;Lisa SANTACRUZ III, Obstetrician;Primary Nurse;Primary [place] Nurse; Anesthesiologist;Crna           Cord pH:  none    Episiotomy:    Laceration(s):      Estimated Blood Loss (ml): No data found    Labor Events  Method: None      Augmentation: None   Cervical Ripening:     None        Hospital Problems  Date Reviewed: 3/4/2020        Codes Class Noted POA    Breech presentation ICD-10-CM: O32. 1XX0  ICD-9-CM: 652.20  2021 Unknown        Term pregnancy ICD-10-CM: Z34.90  ICD-9-CM: V22.1  2021 Unknown              Operative Vaginal Delivery - none    Group B Strep: No results found for: Roel Bookbinder  Information for the patient's :  Nu Oseguera [[id number]]   No results found for: ABORH, PCTABR, PCTDIG, BILI, ABORHEXT, ABORH     No results found for: APH, APCO2, APO2, AHCO3, ABEC, ABDC, O2ST, EPHV, PCO2V, PO2V, HCO3V, EBEV, EBDV, SITE, RSCOM

## 2021-06-25 NOTE — PROGRESS NOTES
TRANSFER - IN REPORT:    Verbal report received from D. Seaver rn(name) on [employer]  being received from L&D(unit) for routine progression of care      Report consisted of patients Situation, Background, Assessment and   Recommendations(SBAR). Information from the following report(s) SBAR was reviewed with the receiving nurse. Opportunity for questions and clarification was provided. Assessment completed upon patients arrival to unit and care assumed.

## 2021-06-25 NOTE — PROGRESS NOTES
3338 Patient received ambulatory to LDR # 3 for scheduled primary  section for breech presentation with Dr. Louis Chen  2626 Consent signed  9107 Abdominal clipper   0800 External fetal monitor applied  0810 IV inserted # 20 g BD to LFA site intact, routine labs drawn. Having contractions, patient aware of mild cramping  0930 Dr. Louis Chen at bedside, viewing FHR tracing,   0945 Abdominal ultrasound by Dr. Louis Chen to confirm breech presentation  7901 Dr. Kimberly Shaw at bedside to discuss anesthesia  1012 External fetal monitor discontinued, transferred to OR # 2 ambulatory  1135 Transferred back to LDR # 3 for recovery via bed   1156 Complains of feeling dizzy, /64, will continue to monitor  1210 Dizziness improved  1220 Assistance with breastfeeding provided  1305 Partial bed bath given, fresh peripad applied, discomfort remains tolerable, resting  1330 Patient complains of feeling dizzy when she attempts to elevate head of bed  1340 Dr. Kimberly Shaw notified patient feeling dizzy with elevation of head, VS stable , orders received for IVF bolus and repeat H&H after bolus infused  1410 Patient states she feels slightly better, declines medication for discomfort. [facility] 141 Dr. Louis Chen called and notified of patient having intermittent periods of dizziness with elevation of head, Vital signs stable 130/70's, O2 sat's 93-97%, Afebrile, IV fluid bolus given per Dr. Kimberly Shaw. QBL 1170 ml in OR, 1250 ml cumulative. Orders received for Stat H&H. Will keep patient in L&D until resulted. 1430 H&H drawn and sent   1500 Complains of slight nausea, declines medication, denies discomfort  1520 Dr. Louis Chen notified of  H&H 10. 32.4 and vital signs. Patient may be transferred to MIU     1545 TRANSFER - OUT REPORT:    Patient transferred  Via hospital bed, Verbal report given to BRIDGETT Luo RN on [employer]  being transferred to room 340 on MIU (unit) for routine progression of care       Report consisted of patients Situation, Background, Assessment and   Recommendations(SBAR). Information from the following report(s) SBAR, Intake/Output, MAR and Recent Results was reviewed with the receiving nurse. Lines:   Peripheral IV 06/25/21 Left;Posterior Forearm (Active)   Site Assessment Clean, dry, & intact 06/25/21 1229   Phlebitis Assessment 0 06/25/21 1229   Infiltration Assessment 0 06/25/21 1229   Dressing Status Clean, dry, & intact 06/25/21 1229   Dressing Type Tape;Transparent 06/25/21 1229   Hub Color/Line Status Pink 06/25/21 1229   Alcohol Cap Used Yes 06/25/21 1229        Opportunity for questions and clarification was provided.       Patient transported with:   Registered Nurse

## 2021-06-25 NOTE — ANESTHESIA PREPROCEDURE EVALUATION
Relevant Problems   No relevant active problems       Anesthetic History   No history of anesthetic complications            Review of Systems / Medical History  Patient summary reviewed, nursing notes reviewed and pertinent labs reviewed    Pulmonary  Within defined limits                 Neuro/Psych   Within defined limits           Cardiovascular  Within defined limits                Exercise tolerance: >4 METS     GI/Hepatic/Renal  Within defined limits              Endo/Other      Hypothyroidism: well controlled  Morbid obesity     Other Findings              Physical Exam    Airway  Mallampati: II  TM Distance: 4 - 6 cm  Neck ROM: normal range of motion   Mouth opening: Normal     Cardiovascular  Regular rate and rhythm,  S1 and S2 normal,  no murmur, click, rub, or gallop             Dental  No notable dental hx       Pulmonary  Breath sounds clear to auscultation               Abdominal  GI exam deferred       Other Findings            Anesthetic Plan    ASA: 3  Anesthesia type: spinal      Post-op pain plan if not by surgeon: intrathecal opiates      Anesthetic plan and risks discussed with: Patient and Spouse

## 2021-06-26 LAB
BASOPHILS # BLD: 0.1 K/UL (ref 0–0.1)
BASOPHILS NFR BLD: 1 % (ref 0–1)
DIFFERENTIAL METHOD BLD: ABNORMAL
EOSINOPHIL # BLD: 0.1 K/UL (ref 0–0.4)
EOSINOPHIL NFR BLD: 1 % (ref 0–7)
ERYTHROCYTE [DISTWIDTH] IN BLOOD BY AUTOMATED COUNT: 15 % (ref 11.5–14.5)
HCT VFR BLD AUTO: 29.2 % (ref 35–47)
HGB BLD-MCNC: 9.3 G/DL (ref 11.5–16)
IMM GRANULOCYTES # BLD AUTO: 0.1 K/UL (ref 0–0.04)
IMM GRANULOCYTES NFR BLD AUTO: 1 % (ref 0–0.5)
LYMPHOCYTES # BLD: 1.7 K/UL (ref 0.8–3.5)
LYMPHOCYTES NFR BLD: 18 % (ref 12–49)
MCH RBC QN AUTO: 27.4 PG (ref 26–34)
MCHC RBC AUTO-ENTMCNC: 31.8 G/DL (ref 30–36.5)
MCV RBC AUTO: 85.9 FL (ref 80–99)
MONOCYTES # BLD: 0.7 K/UL (ref 0–1)
MONOCYTES NFR BLD: 7 % (ref 5–13)
NEUTS SEG # BLD: 6.9 K/UL (ref 1.8–8)
NEUTS SEG NFR BLD: 72 % (ref 32–75)
NRBC # BLD: 0 K/UL (ref 0–0.01)
NRBC BLD-RTO: 0 PER 100 WBC
PLATELET # BLD AUTO: 162 K/UL (ref 150–400)
PMV BLD AUTO: 11.5 FL (ref 8.9–12.9)
RBC # BLD AUTO: 3.4 M/UL (ref 3.8–5.2)
WBC # BLD AUTO: 9.5 K/UL (ref 3.6–11)

## 2021-06-26 PROCEDURE — 85025 COMPLETE CBC W/AUTO DIFF WBC: CPT

## 2021-06-26 PROCEDURE — 74011250636 HC RX REV CODE- 250/636: Performed by: ANESTHESIOLOGY

## 2021-06-26 PROCEDURE — 65410000002 HC RM PRIVATE OB

## 2021-06-26 PROCEDURE — 36415 COLL VENOUS BLD VENIPUNCTURE: CPT

## 2021-06-26 PROCEDURE — 74011250637 HC RX REV CODE- 250/637: Performed by: OBSTETRICS & GYNECOLOGY

## 2021-06-26 RX ORDER — SIMETHICONE 80 MG
80 TABLET,CHEWABLE ORAL
Status: DISCONTINUED | OUTPATIENT
Start: 2021-06-26 | End: 2021-06-27 | Stop reason: HOSPADM

## 2021-06-26 RX ORDER — ONDANSETRON 2 MG/ML
4 INJECTION INTRAMUSCULAR; INTRAVENOUS
Status: DISCONTINUED | OUTPATIENT
Start: 2021-06-26 | End: 2021-06-27 | Stop reason: HOSPADM

## 2021-06-26 RX ORDER — OXYCODONE AND ACETAMINOPHEN 5; 325 MG/1; MG/1
2 TABLET ORAL
Status: DISCONTINUED | OUTPATIENT
Start: 2021-06-26 | End: 2021-06-27 | Stop reason: HOSPADM

## 2021-06-26 RX ORDER — AMMONIA 15 % (W/V)
1 AMPUL (EA) INHALATION AS NEEDED
Status: DISCONTINUED | OUTPATIENT
Start: 2021-06-26 | End: 2021-06-27 | Stop reason: HOSPADM

## 2021-06-26 RX ORDER — SODIUM CHLORIDE 0.9 % (FLUSH) 0.9 %
5-40 SYRINGE (ML) INJECTION AS NEEDED
Status: DISCONTINUED | OUTPATIENT
Start: 2021-06-26 | End: 2021-06-27 | Stop reason: HOSPADM

## 2021-06-26 RX ORDER — HYDROCORTISONE 1 %
CREAM (GRAM) TOPICAL AS NEEDED
Status: DISCONTINUED | OUTPATIENT
Start: 2021-06-26 | End: 2021-06-27 | Stop reason: HOSPADM

## 2021-06-26 RX ORDER — DOCUSATE SODIUM 100 MG/1
100 CAPSULE, LIQUID FILLED ORAL
Status: DISCONTINUED | OUTPATIENT
Start: 2021-06-26 | End: 2021-06-27 | Stop reason: HOSPADM

## 2021-06-26 RX ORDER — DIPHENHYDRAMINE HCL 25 MG
25 CAPSULE ORAL
Status: DISCONTINUED | OUTPATIENT
Start: 2021-06-26 | End: 2021-06-27 | Stop reason: HOSPADM

## 2021-06-26 RX ORDER — IBUPROFEN 400 MG/1
800 TABLET ORAL
Status: DISCONTINUED | OUTPATIENT
Start: 2021-06-26 | End: 2021-06-27 | Stop reason: HOSPADM

## 2021-06-26 RX ORDER — OXYCODONE AND ACETAMINOPHEN 5; 325 MG/1; MG/1
1 TABLET ORAL
Status: DISCONTINUED | OUTPATIENT
Start: 2021-06-26 | End: 2021-06-27 | Stop reason: HOSPADM

## 2021-06-26 RX ORDER — SODIUM CHLORIDE 0.9 % (FLUSH) 0.9 %
5-40 SYRINGE (ML) INJECTION EVERY 8 HOURS
Status: DISCONTINUED | OUTPATIENT
Start: 2021-06-26 | End: 2021-06-27 | Stop reason: HOSPADM

## 2021-06-26 RX ORDER — SODIUM CHLORIDE, SODIUM LACTATE, POTASSIUM CHLORIDE, CALCIUM CHLORIDE 600; 310; 30; 20 MG/100ML; MG/100ML; MG/100ML; MG/100ML
125 INJECTION, SOLUTION INTRAVENOUS CONTINUOUS
Status: DISCONTINUED | OUTPATIENT
Start: 2021-06-26 | End: 2021-06-27 | Stop reason: HOSPADM

## 2021-06-26 RX ADMIN — DOCUSATE SODIUM 100 MG: 100 CAPSULE, LIQUID FILLED ORAL at 21:56

## 2021-06-26 RX ADMIN — IBUPROFEN 800 MG: 400 TABLET ORAL at 13:31

## 2021-06-26 RX ADMIN — LEVOTHYROXINE SODIUM 112 MCG: 0.11 TABLET ORAL at 05:25

## 2021-06-26 RX ADMIN — IBUPROFEN 800 MG: 400 TABLET ORAL at 21:39

## 2021-06-26 RX ADMIN — KETOROLAC TROMETHAMINE 30 MG: 30 INJECTION, SOLUTION INTRAMUSCULAR; INTRAVENOUS at 07:29

## 2021-06-26 RX ADMIN — OXYCODONE HYDROCHLORIDE AND ACETAMINOPHEN 1 TABLET: 5; 325 TABLET ORAL at 21:39

## 2021-06-26 RX ADMIN — KETOROLAC TROMETHAMINE 30 MG: 30 INJECTION, SOLUTION INTRAMUSCULAR; INTRAVENOUS at 01:00

## 2021-06-26 NOTE — ROUTINE PROCESS
0730: Bedside shift change report given to DANIEL Murdock RN (oncoming nurse) by JIMENEZ Hsu RN (offgoing nurse). Report included the following information SBAR.

## 2021-06-26 NOTE — LACTATION NOTE
This note was copied from a baby's chart. Baby nursing well today with assistance,  deep latch obtained, mother is comfortable, baby feeding vigorously with rhythmic suck, swallow, breathe pattern, audible swallowing, and evident milk transfer, both breasts offered, baby is asleep following feeding. BAby having tachypnea during and after feed. Nurse informed and assessed baby.

## 2021-06-26 NOTE — PROGRESS NOTES
Post-Operative  Day 1    Maddie Lambert     Assessment: Post-Op day 1, stable    Plan:   1. Routine post-operative care   2. The risks and benefits of the circumcision  procedure and anesthesia including: bleeding, infection, variability of cosmetic results were discussed at length with the mother. She gives informed consent to proceed as noted and her questions are answered. 3. hypothyroid -  On synthroid  4. Post-op anemia - Hgb 9.3, VSS, home on iron  5. Hx depression - no meds, doing ok     Information for the patient's :  Jeannine Matta [[id number]]   , Low Transverse    Patient doing well without significant complaint. Nausea and vomiting resolved, tolerating PO, no flatus. Vitals:  Visit Vitals  /71 (BP 1 Location: Left arm, BP Patient Position: At rest)   Pulse 95   Temp 98 °F (36.7 °C)   Resp 16   Ht 5' 5\" (1.651 m)   Wt 114.3 kg (252 lb)   SpO2 96%   Breastfeeding No   BMI 41.93 kg/m²     Temp (24hrs), Av.3 °F (36.8 °C), Min:97.7 °F (36.5 °C), Max:99.3 °F (37.4 °C)      Last 24hr Input/Output:    Intake/Output Summary (Last 24 hours) at 2021 0931  Last data filed at 2021 2300  Gross per 24 hour   Intake    Output 2450 ml   Net -2450 ml          Exam:        Patient without distress. Abdomen, soft, expected tenderness, fundus firm Wound dressing intact               Lower extremities are symmetric without tenderness, cords or erythema.     Labs:   Lab Results   Component Value Date/Time    WBC 9.5 2021 05:37 AM    WBC 8.9 2021 08:20 AM    WBC 11.2 (H) 06/15/2020 12:21 PM    WBC 7.4 2009 03:50 PM    HGB 9.3 (L) 2021 05:37 AM    HGB 10.1 (L) 2021 02:25 PM    HGB 12.1 2021 08:20 AM    HGB 13.5 06/15/2020 12:21 PM    HGB 13.4 2009 03:50 PM    HCT 29.2 (L) 2021 05:37 AM    HCT 32.4 (L) 2021 02:25 PM    HCT 37.5 2021 08:20 AM    HCT 42.5 06/15/2020 12:21 PM    HCT 41.0 2009 03:50 PM    PLATELET 162 06/26/2021 05:37 AM    PLATELET 690 68/52/6687 08:20 AM    PLATELET 491 25/56/3660 12:21 PM    PLATELET 345 78/33/7062 03:50 PM       Recent Results (from the past 24 hour(s))   HGB & HCT    Collection Time: 06/25/21  2:25 PM   Result Value Ref Range    HGB 10.1 (L) 11.5 - 16.0 g/dL    HCT 32.4 (L) 35.0 - 47.0 %   CBC WITH AUTOMATED DIFF    Collection Time: 06/26/21  5:37 AM   Result Value Ref Range    WBC 9.5 3.6 - 11.0 K/uL    RBC 3.40 (L) 3.80 - 5.20 M/uL    HGB 9.3 (L) 11.5 - 16.0 g/dL    HCT 29.2 (L) 35.0 - 47.0 %    MCV 85.9 80.0 - 99.0 FL    MCH 27.4 26.0 - 34.0 PG    MCHC 31.8 30.0 - 36.5 g/dL    RDW 15.0 (H) 11.5 - 14.5 %    PLATELET 514 051 - 832 K/uL    MPV 11.5 8.9 - 12.9 FL    NRBC 0.0 0  WBC    ABSOLUTE NRBC 0.00 0.00 - 0.01 K/uL    NEUTROPHILS 72 32 - 75 %    LYMPHOCYTES 18 12 - 49 %    MONOCYTES 7 5 - 13 %    EOSINOPHILS 1 0 - 7 %    BASOPHILS 1 0 - 1 %    IMMATURE GRANULOCYTES 1 (H) 0.0 - 0.5 %    ABS. NEUTROPHILS 6.9 1.8 - 8.0 K/UL    ABS. LYMPHOCYTES 1.7 0.8 - 3.5 K/UL    ABS. MONOCYTES 0.7 0.0 - 1.0 K/UL    ABS. EOSINOPHILS 0.1 0.0 - 0.4 K/UL    ABS. BASOPHILS 0.1 0.0 - 0.1 K/UL    ABS. IMM.  GRANS. 0.1 (H) 0.00 - 0.04 K/UL    DF AUTOMATED

## 2021-06-27 VITALS
RESPIRATION RATE: 14 BRPM | WEIGHT: 252 LBS | TEMPERATURE: 97.8 F | DIASTOLIC BLOOD PRESSURE: 65 MMHG | HEIGHT: 65 IN | SYSTOLIC BLOOD PRESSURE: 119 MMHG | BODY MASS INDEX: 41.99 KG/M2 | OXYGEN SATURATION: 100 % | HEART RATE: 79 BPM

## 2021-06-27 PROCEDURE — 74011250637 HC RX REV CODE- 250/637: Performed by: OBSTETRICS & GYNECOLOGY

## 2021-06-27 RX ORDER — OXYCODONE AND ACETAMINOPHEN 5; 325 MG/1; MG/1
2 TABLET ORAL
Qty: 30 TABLET | Refills: 0 | Status: SHIPPED | OUTPATIENT
Start: 2021-06-27 | End: 2021-07-02

## 2021-06-27 RX ORDER — IBUPROFEN 800 MG/1
800 TABLET ORAL
Qty: 30 TABLET | Refills: 0 | Status: SHIPPED | OUTPATIENT
Start: 2021-06-27 | End: 2021-08-23

## 2021-06-27 RX ADMIN — LEVOTHYROXINE SODIUM 112 MCG: 0.11 TABLET ORAL at 06:22

## 2021-06-27 RX ADMIN — IBUPROFEN 800 MG: 400 TABLET ORAL at 06:22

## 2021-06-27 NOTE — DISCHARGE INSTRUCTIONS
Patient Education      remove bandage in one week, sooner if preferred   Section: What to Expect at [address] [facility]     A  section, or , is surgery to deliver your baby through a cut that the doctor makes in your lower belly and uterus. The cut is called an incision. You may have some pain in your lower belly and need pain medicine for 1 to 2 weeks. You can expect some vaginal bleeding for several weeks. You will probably need about 6 weeks to fully recover. It's important to take it easy while the incision heals. Avoid heavy lifting, strenuous activities, and exercises that strain the belly muscles while you recover. Ask a family member or friend for help with housework, cooking, and shopping. This care sheet gives you a general idea about how long it will take for you to recover. But each person recovers at a different pace. Follow the steps below to get better as quickly as possible. How can you care for yourself at home? Activity    · Rest when you feel tired. Getting enough sleep will help you recover.     · Try to walk each day. Start by walking a little more than you did the day before. Bit by bit, increase the amount you walk. Walking boosts blood flow and helps prevent pneumonia, constipation, and blood clots.     · Avoid strenuous activities, such as bicycle riding, jogging, weightlifting, and aerobic exercise, for 6 weeks or until your doctor says it is okay.     · Until your doctor says it is okay, do not lift anything heavier than your baby.     · Do not do sit-ups or other exercises that strain the belly muscles for 6 weeks or until your doctor says it is okay.     · Hold a pillow over your incision when you cough or take deep breaths. This will support your belly and decrease your pain.     · You may shower as usual. Pat the incision dry when you are done.     · You will have some vaginal bleeding. Wear sanitary pads.  Do not douche or use tampons until your doctor says it is okay.     · Ask your doctor when you can drive again.     · You will probably need to take at least 6 weeks off work. It depends on the type of work you do and how you feel.     · Ask your doctor when it is okay for you to have sex. Diet    · You can eat your normal diet. If your stomach is upset, try bland, low-fat foods like plain rice, broiled chicken, toast, and yogurt.     · Drink plenty of fluids (unless your doctor tells you not to).     · You may notice that your bowel movements are not regular right after your surgery. This is common. Try to avoid constipation and straining with bowel movements. You may want to take a fiber supplement every day. If you have not had a bowel movement after a couple of days, ask your doctor about taking a mild laxative.     · If you are breastfeeding, limit alcohol. Alcohol can cause a lack of energy and other health problems for the baby when a breastfeeding woman drinks heavily. It can also get in the way of a mom's ability to feed her baby or to care for the child in other ways. There isn't a lot of research about exactly how much alcohol can harm a baby. Having no alcohol is the safest choice for your baby. If you choose to have a drink now and then, have only one drink, and limit the number of occasions that you have a drink. Wait to breastfeed at least 2 hours after you have a drink to reduce the amount of alcohol the baby may get in the milk. Medicines    · Your doctor will tell you if and when you can restart your medicines. He or she will also give you instructions about taking any new medicines.     · If you take aspirin or some other blood thinner, ask your doctor if and when to start taking it again. Make sure that you understand exactly what your doctor wants you to do.     · Take pain medicines exactly as directed. ? If the doctor gave you a prescription medicine for pain, take it as prescribed.   ? If you are not taking a prescription pain medicine, ask your doctor if you can take an over-the-counter medicine.     · If you think your pain medicine is making you sick to your stomach:  ? Take your medicine after meals (unless your doctor has told you not to). ? Ask your doctor for a different pain medicine.     · If your doctor prescribed antibiotics, take them as directed. Do not stop taking them just because you feel better. You need to take the full course of antibiotics. Incision care    · If you have strips of tape on the incision, leave the tape on for a week or until it falls off.     · Wash the area daily with warm, soapy water, and pat it dry. Don't use hydrogen peroxide or alcohol, which can slow healing. You may cover the area with a gauze bandage if it weeps or rubs against clothing. Change the bandage every day.     · Keep the area clean and dry. Other instructions    · If you breastfeed your baby, you may be more comfortable while you are healing if you place the baby so that he or she is not resting on your belly. Try tucking your baby under your arm, with his or her body along the side you will be feeding on. Support your baby's upper body with your arm. With that hand you can control your baby's head to bring his or her mouth to your breast. This is sometimes called the football hold. Follow-up care is a key part of your treatment and safety. Be sure to make and go to all appointments, and call your doctor if you are having problems. It's also a good idea to know your test results and keep a list of the medicines you take. When should you call for help? Call  911 anytime you think you may need emergency care. For example, call if:    · You have thoughts of harming yourself, your baby, or another person.     · You passed out (lost consciousness).     · You have chest pain, are short of breath, or cough up blood.     · You have a seizure.    Call your doctor now or seek immediate medical care if:    · You have pain that does not get better after you take pain medicine.     · You have severe vaginal bleeding.     · You are dizzy or lightheaded, or you feel like you may faint.     · You have new or worse pain in your belly or pelvis.     · You have loose stitches, or your incision comes open.     · You have symptoms of infection, such as:  ? Increased pain, swelling, warmth, or redness. ? Red streaks leading from the incision. ? Pus draining from the incision. ? A fever.     · You have symptoms of a blood clot in your leg (called a deep vein thrombosis), such as:  ? Pain in your calf, back of the knee, thigh, or groin. ? Redness and swelling in your leg or groin.     · You have signs of preeclampsia, such as:  ? Sudden swelling of your face, hands, or feet. ? New vision problems (such as dimness, blurring, or seeing spots). ? A severe headache. Watch closely for changes in your health, and be sure to contact your doctor if:    · You do not get better as expected. Where can you learn more? Go to http://www.dueñas.com/  Enter M806 in the search box to learn more about \" Section: What to Expect at Home. \"  Current as of: 2020               Content Version: 12.8   [employer]. Care instructions adapted under license by [employer] (which disclaims liability or warranty for this information). If you have questions about a medical condition or this instruction, always ask your healthcare professional. [place] [address] any warranty or liability for your use of this information.

## 2021-06-27 NOTE — PROGRESS NOTES
Post-Operative  Day 2    Maddie Syracuse     Assessment: Post-Op day 2, doing well    Plan:   1. Routine post-operative care  2 hypothyroid -  On synthroid  3 Post-op anemia - Hgb 9.3, VSS, home on iron  4 Hx depression - no meds, doing ok     Information for the patient's :  Chad Brambila [[id number]]   , Low Transverse    Patient doing well without significant complaint. Nausea and vomiting resolved, tolerating PO, passing flatus, voiding and ambulating without difficulty. Vitals:  Visit Vitals  /71 (BP 1 Location: Right arm, BP Patient Position: At rest)   Pulse 78   Temp 98 °F (36.7 °C)   Resp 16   Ht 5' 5\" (1.651 m)   Wt 114.3 kg (252 lb)   SpO2 98%   Breastfeeding No   BMI 41.93 kg/m²     Temp (24hrs), Av.1 °F (36.7 °C), Min:98 °F (36.7 °C), Max:98.2 °F (36.8 °C)        Exam:        Patient without distress. Abdomen, soft, expected tenderness, fundus firm                Wound incision clean, dry and intact               Lower extremities are symmetric without tenderness, cords or erythema. Labs:   Lab Results   Component Value Date/Time    WBC 9.5 2021 05:37 AM    WBC 8.9 2021 08:20 AM    WBC 11.2 (H) 06/15/2020 12:21 PM    WBC 7.4 2009 03:50 PM    HGB 9.3 (L) 2021 05:37 AM    HGB 10.1 (L) 2021 02:25 PM    HGB 12.1 2021 08:20 AM    HGB 13.5 06/15/2020 12:21 PM    HGB 13.4 2009 03:50 PM    HCT 29.2 (L) 2021 05:37 AM    HCT 32.4 (L) 2021 02:25 PM    HCT 37.5 2021 08:20 AM    HCT 42.5 06/15/2020 12:21 PM    HCT 41.0 2009 03:50 PM    PLATELET 128  05:37 AM    PLATELET 127  08:20 AM    PLATELET 195  12:21 PM    PLATELET 837  03:50 PM       No results found for this or any previous visit (from the past 24 hour(s)).

## 2021-06-27 NOTE — PROGRESS NOTES
Bedside and Verbal shift change report given to BRIDGETT Esparza RN (oncoming nurse) by JIMENEZ Bedolla RN (offgoing nurse). Report included the following information SBAR.       1115: I have reviewed discharge instructions with the patient. The patient verbalized understanding.

## 2021-06-27 NOTE — LACTATION NOTE
This note was copied from a baby's chart. Baby has been having occasional sustained feeds but has become disorganized overnight with intermittent tachypnea during feedings, fussiness, with struggle to stay latched longer than a few minutes. Baby has been getting more and more ravenous as a result. Pump set up and shown how to use. No more breast milk was able to be expressed. Baby had emptied both well but was still hungry. Infant doesn't have a pediatric appointment set up yet due to weekend. They plan to call the pediatrician to try to get an appointment tomorrow. Parents state that baby's voids have been slowing down. Supplement after feedings is indicated as needed to ensure baby remains hydrated over then next day(s) until mother's milk is in. At this time it is not, and mother's breasts remain flaccid. She plans to nurse baby, do some pumping, and supplement with EBM/formula as needed to satiate baby after nursing sessions. Mother states that she has no further questions for Lactation Consultant before discharge.

## 2021-06-28 NOTE — ADT AUTH CERT NOTES
LABOR AND DELIVERY NOTIFICATION   ADMIT DATE: 2021  BABY : 2021    PLEASE FAX BACK RESPONSE    VERENA SHER   UR CONTACT   [phone] FAX (PREFERRED)       H&P + F SHEET     Patient Demographics    Patient Name   Tres Douglas Legal Sex   Female    1990 Address   [facility] [address]  [address] Phone   [phone] (Home)   [phone] (Mobile) *[facility] Account    Name Acct ID Class Status Primary Coverage   Tres Pac [id number] INPATIENT Discharged/Not Billed BLUE CROSS - [facility] PPO          Guarantor Account (for Hospital Account [de-identified])    Name Relation to Pt Service Area Active? Acct Type   Tres Pac Self [facility] Yes Personal/Family   Address Phone     [address] [phone](L)            Coverage Information (for Hospital Account [de-identified])    F/O Payor/Plan Subscriber  Subscriber Sex Precert #   BLUE CROSS/[employer] [place] CROSS [facility] PPO 89 F    Subscriber Subscriber #   Tres Pac [id number]   Grp # Group Name   [id number]    Address Phone   Mandi Acuna [address]    Policy Number Status Effective Date Benefits Phone   [id number] -  -   Auth/Cert             Diagnosis     Codes Comments    delivery delivered  ICD-10-CM: O82   ICD-9-CM: 669.71           Admission Information    Arrival Date/Time: 2021 0750 Admit Date/Time: 2021 0751 IP Adm.  Date/Time: 2021 075   Admission Type: Urgent Point of Origin: Non-health Care Facility/self Admit Category:    Means of Arrival:  Primary Service: Obstetrics Secondary Service: N/A   Transfer Source:  Service Area: [facility] Unit: [facility] 3W MOTHER INFANT   Admit Provider: Brian Wolfe MD Attending Provider: Brian Wolfe MD Referring Provider:    Admission Information    Attending Provider Admission Dx Admitted on    Term pregnancy, Breech presentation 21   Service Isolation Code Status   OBSTETRICS  Not on file   Allergies Advance Care Planning    No Known Allergies Jump to the Activity     Admission Information    Unit/Bed: [facility] 3W MOTHER INFANT/ Service: OBSTETRICS   Admitting provider: Darvin Vo MD Phone: [phone]   Attending provider:  Phone:    PCP: Milton Prescott MD Phone: [phone]   Admission dx: , BREECH Patient class: I   Admission type: UR     Patient Demographics    Patient Name   Ronel Marcos   [id number] Legal Sex   Female    1990 Address   [address] [place] [address] Phone   [phone] (Home)   [phone] (Mobile) *Preferred*   H&P Notes     H&P by Darvin Vo MD at 21 documented on Admission (Discharged) from 2021 in [address]  Author: Darvin Vo MD Author Type: Physician Filed: 21 0949   Note Status: Addendum Cosign: Cosign Not Required Date of Service: 21   : Darvin Vo MD (Physician)   Prior Versions: 1. Darvin Vo MD (Physician) at 21 1129 - Signed              Print       Patient  Name Buster Mckeon (33yo, F) ID# [id number] Appt. Date/Time 2021 11:01AM    1990 Service Dept.  [facility]   Provider Chito Shukla MD   Insurance Med Primary: [facility] (PPO)  Insurance # : [id number]  Policy/Group # : [id number]  Employer Name : [address] [place] [address]  Prescription: Madison Soriano - Member is eligible. details   Patient's Care Team  Primary Care [facility] MD: [address], [facility], [address] [facility], Ph [phone], Fax [phone] NPI: [id number]  Susana Donovan MD (VIRTUAL VISITS AVAILABLE): [address] [facility] [address], [facility], [address] [place] [address], Ph [phone], Fax [phone] NPI: [id number]  Patient's Pharmacies  [facility] #[id number]): [address] [place] [address], [address], [place], Ph [phone], Fax [phone]  Allergies  Allergies not reviewed (last reviewed 11/05/2020)      NKDA   Medications       Medications not reviewed (last reviewed 06/21/2021)      butalbital-acetaminophen-caffeine 50 mg-325 mg-40 mg tablet  TAKE 1 TABLET BY ORAL ROUTE EVERY 4-6 HOURS prn severe migraine 02/04/21   entered [facility] Frank   Iron (ferrous sulfate) 325 mg (65 mg iron) tablet  Take 1 tablet(s) every day by oral route. , start 04/14/2021 04/14/21   started Shobha Frakn   levothyroxine 112 mcg tablet  TAKE 1 TABLET BY MOUTH EVERY DAY 04/27/21   renewed Kyle Naranjo MD   Prenatal + DHA 12/14/20   entered [facility]   Vaccines  Vaccines not reviewed (last reviewed 11/09/2020)                                                                              Vaccine Type Date Amt. Route Site [address] Lot # Mfr. Exp.   Date Date  on VIS VIS  Given Vaccinator                      Diphtheria, Tetanus, Pertussis                      Tdap 04/12/21 0.5 mL Intramuscular Deltoid, Right   [id number] [employer] 02/28/23 04/01/20 04/12/21 Cisco Mojica                                                                                                       HPV                     HPV, quadrivalent 03/24/16                                                                                                                           HPV, quadrivalent 04/28/14                                                                                                                           HPV, quadrivalent 02/26/14                                                                                                                           Problems  Reviewed Problems     · Obesity - Onset: 05/14/2018 - Entered By: Teresa Miguel LPN - Team 2 WSTSigned By: Marty Barkley MD Description: Obesity (BMI > 29.99) code: 278.00  · Oral contraception - Onset: 04/03/2017 - Entered By: Marty Barkley MDSigned By: Marty Barkley MD Description: Oral contraceptive pills follow up code: V25.41    2021-10am [facility] L&D/primary section/Vero-on Hunter tidwell asked  Family History  Discussed Family History     Father - Cerebrovascular accident     - Hypertensive disorder     - Hyperlipidemia   Mother - Fibromyalgia   Social History  Discussed Social History  OB/GYN Social History  Chewing tobacco: none  Tobacco Smoking Status: Former smoker  Non-smoker  Smokeless Tobacco Status: Never used smokeless tobacco  Tobacco-years of use: 0  E-cigarette/Vape Status: Never used electronic cigarettes  Most Recent Tobacco Use Screenin/10/2019  Marital status:   Number of children: 0  Are you working: Yes  Occupation:   How often do you have a DRINK containing ALCOHOL?: Monthly or less (Notes: no current use-pregnant)  Illicit drugs: no  Have you recently (within the last 12 weeks, or during a current pregnancy) traveled to or lived in a Zika-affected area?: N  Is blood transfusion acceptable in an emergency?: Y  Surgical History  Reviewed Surgical History     · Appendectomy - 2009  GYN History  Reviewed GYN History  Date of LMP: 2020 (Notes: Pregnant -stopped bcp 2020). Frequency of Cycle (Q days): (Notes: regular). Duration of Flow (days): 5. Flow: Heavy. Menses Monthly: Y. Menstrual Cramps: mild. Date of Last Pap Smear: 2020 (Notes: 20 - NIL 20- CHETAN - abnormal (pt unsure of exact result) -performed at [facility] in [facility]). Abnormal Pap: Y. Abnormal Pap Smear result: CHETAN. Any Treatment for Abnormal Pap?: N.  HPV Vaccine: Y. Sexually Active?: Y.  STIs/STDs: N.  Current Birth Control Method: Pregnant. Endometriosis: N. Fibroids: N. Infertility: N. Ovarian Cyst: N. PCOS: N.  Obstetric History  Reviewed Obstetric History     TOTAL FULL PRE AB. I AB.  S ECTOPICS MULTIPLE LIVING   1             0   Pregnancy Problem List  · Breech presentation  · Depressive disorder - Discussed counseling vs meds Declines meds at this time going to try counseling  · Anemia - on fe  · Migraine - Saw Neurologist - Told silent migraines Per Neuro- MRI delayed til after pregnancy, advised can do now if indicated Also will contact if medications are recommended to review safety  · Large for gestation age fetus - >99%ile at 25wk, 33wk growth 95%tile 32wks EFW > 99%tile Repeat at 36 weeks 92%tile  · Maternal obesity complicating pregnancy, childbirth and the puerperium, antepartum - BMI 35, Rec Aspirin 81mg Early Glucola WNL  · Primigravida  · Hypothyroidism - TSH 1: 4.6 (synthroid increased to 112mcg qd) , 16wk: 1.35, 3rd trimester labs: TSH 2.260  Genetic Screening and Infection History  Medications (including Supplements, Vitamins, Herbs, OTC Drugs), Illicit/Recreational Drugs, Alcohol: Y, Levothyroxine 75mcg.   Patient's Age Will Be 28 Years Or Older At Estimated Date of Delivery: N  2824: Thalassemia ([facility] [place], [place], Or  Background): MCV < 80: N  Neural Tube Defect (Meningomyelocele, Spina Bifida, Or Anencephaly): N  746: Congenital Heart Defect: N  7580: Down Syndrome: N  Tru-Sachs (eg, [facility], Kevin): N  Canavan Disease: N  282: Sickle Cell Disease Or Trait (): N  Hemophilia Or Other Blood Disorders: N  359: Muscular Dystrophy: N  2770: Cystic Fibrosis: N  3334: [facility]'s Chorea: N  319: Mental Retardation/Autism: N  If Yes, Was Person Tested For Fragile X?: N  Other Inherited Genetic Or Chromosomal Disorder: N  Maternal Metabolic Disorder (eg, Type 1 Diabetes, PKU): N  Patient Or [de-identified] Father Had A Child With Birth Defects Not Listed Above: N  Recurrent Pregnancy Loss, Or A Stillbirth: N  If Yes, Agent(s) And Strength/Dosage: N  Any Other Genetic History: N  Live With Someone With TB Or Exposed To TB: N  Patient Or Partner Has History Of Genital Herpes: N  Rash Or Viral Illness Since Last Menstrual Period: N  History Of STD, Gonorrhea, Chlamydia, HPV, Syphilis: N  Other Infection History: N  [place] Plume Syndrome: N  Gaucher Disease: N  Cori-Pick Disease: N  Fanconi Anemia: N  Familial Dysautonia: N  History of Chicken Pox: N  Bone/Skeletal Defects: N  Dwarfism: N  Developmental Delay: N  Learning Problems: N  Polycystic Kidney Disease: N  Marfan Syndrome: N  Galactosemia: N  Deafness/Blindness: N  Color Blindness: N  Hemochromatosis: N  Prior GBS-infected child: N  History of Hepatitis: N  History of HIV: N  Neurologic (brain/spine): N  Prenatal Flowsheet  Fundus Pres FHR FM PLS Cervix Exam BP Wt Edema Glucose Protein Leukocytes Nitrite Ketones Blood   11/05/2020   7 wks 3 days                                                         Comments: chart prep: Patient unsure of hospital of delivery. Patient c/o nausea. Patient c/o abnormal vaginal discharge w/odor, first noticing about 1 week ago after intercourse. Patient denies any headaches, vomiting, vaginal bleeding or abnormal discharge. 11/09/2020   6 wks 4 days                             136             none neg           Comments: Urine culture collected. 11/09/2020   6 wks 4 days   [facility]                                   112/68 219 lbs none               Comments: Rm 7: c/o severe nausea - only able to keep ginger ale down, not much else. Would like to discuss medication options. Denies bleeding. Declines flu vaccine today. EDC by U/S today, collect prenatal labs, reviewed genetic testing options - considering NIPT, SMA/CF - undecided, reviewed prenatal care, education, nutrition, weight gain recommendations, exercise, hospitalist/laborist system   12/14/2020   11 wks 4 days   emcgaw                                   115/72 217 lbs                 Comments: Reports nausea with occasional vomiting. Declines medication, will call if worsens. Headaches- discussed hydration and tylenol. Constipation- reviewed OTC medication and diet changes. V-scan confirms + FHTs, and movement. MT21 today. RTO in 4 weeks.    01/12/2021   15 wks 5 days   [email or web address]                           155       118/78 218 lbs none               Comments: Denies VB, LOF, VD, pain. General recommendations and precautions reviewed. All questions answered. rto NV has been slightly improving, but still present. C/o burning pain in right thigh and has moved to lower back. Insomnia due to pain. Sleeping and positioning recs given. Migraine has been pretty mod-severe for past 72 hours. Unrelieved with Tylenol. Rx given for Fioracet (to be used sparingly). Decl AFP today. 02/22/2021   21 wks 4 days   emcgaw                           148 Yes     102/74 224 lbs none               Comments: Anatomy scan today- final read pending. Needs repeat views. early gtts today. Nausea and vomiting resolved. Denies any other issues. RTO in 2 weeks   03/10/2021   23 wks 6 days   emcgaw                             Yes     122/68 226 lbs trace               Comments: US today for repeat anatomy, results pending. Reports sciatic pain on left- declines PT. Reports has two broken toes on left foot- encouraged to see orthopedics. No pain or bleeding. Active fetal movement. RTO 4 weeks for growth scan, Repeat glucola and TSH.   04/12/2021   28 wks 4 days   emcgaw                           149 Yes     120/70 232 lbs trace none trace           Comments: Glucola today. EFW 95%tile. Accepts tdap. In last month started having symptoms of head pressure, numbness and tingling bilaterally, and confusion that resolves after 15min -1 hour Occuring about once a wee. saw neurologist a few weeks ago dx with silent migraines- was told to follow up with obgyn. Advised can get MRI if indicated, if medications are recommended we can check there safety. RTO 2 weeks Thyroid labs today   04/26/2021   30 wks 4 days   [email or web address]                       31 cm   148 Yes     120/68 236 lbs 1+ none neg           Comments: Rm 2: Denies VB/abnormal dc. Has not had any severe episodes of migraines.  Spoke with Neurologist since last visit and was told all nml. Doing well, no complaints. Denies VB, LOF, VD, pain. General recommendations and precautions reviewed. All questions answered. Plans to breastfeed, desired circ. Peds list given. 05/10/2021   32 wks 4 days   emcgaw                           146 Yes     112/80 239 lbs trace none neg           Comments: EFW > 99%tile. Breech. BPP 8/8. Active fetal movement. Occasional round ligament pain. Trouble sleeping for back pain. notes twice daily ringing in her ears . Increase in depression in the last month- no counselor or psych. Declines medication- took in past tried multiple different without improvement. Will start with counseling, recs given. does not have a good support system at home. Occasional thoughts of running away but no SI. RTO 2 weeks. 05/24/2021   34 wks 4 days   emcgaw                           153 Yes     130/78 242 lbs 1+               Comments: Patient started experiencing nausea/ rare vomiting last week along with a loss of appetite. Suspect GERD , will add pepcid. Patient with intermittent low back pain and abdominal pain related to activity. Resolves with rest. Discussed hydration, belly band, and labor precuations. Patient denies any headaches, bleeding, or spotting. Growth scan next appt, confirm present. Discussed ECV if breech. 06/07/2021   36 wks 4 days   emcgaw                           160 Yes   0cm / 0% / -3 114/84 244 lbs trace none neg           Comments: EFW 92%tile BPP 8/8, BREECH. Discussed ECV vs C/S desires C/S. She desires to be done around 39 weeks. nausea, hip and joint pain unbearable- states she can not walk at about 5pm. Declines physical therapy. Active fetal movement. No bleeding or contractions. Desires to come out of work now. Aware that will affect her FMLA.   06/14/2021   37 wks 4 days   emcgaw                           153 Yes     112/76 247 lbs trace               Comments: states she sleeps 2 hours a day due to back pain.  Discussed positioning, unisom etc. Taking more naps now that not working which is helping. Active fetal movement. No contractions or bleeding. Scheduled for C/S at 39 weeks- discussed expectations and COVID testing next week. RTO for last appt new week   06/21/2021   38 wks 4 days   emcgaw                           153 Yes     122/68 252 lbs 2+               Comments: Still with nausea/vomiting occasionally. Swelling in feet and tingling sensation in finger tips. Active fetal movement. No contractions and no bleeding. Scheduled for C/S later this week. 06/24/2021     [email or web address]                                                       OB Lab Results    Result Value Ref.  Range Date Collected Date Reviewed Entered By Note   Initial Labs                   [facility]   [facility]2020 [facility]         D (Rh) Type Positive   [facility]09[facility]2020 [facility]         Antibody Screen Negative NEGATIVE 11/09/2020 11/11/2020 [facility]         Antibody Screen Negative NEGATIVE 04/12/2021 04/14/2021 emcgaw         HCT - Initial 40.3 % 34.0-46.6 11/09/2020 [facility]11[facility]         HGB - Initial 13.3 g/dL 11.1-15.9 11/09/2020 11/11/2020 [facility]         MCV - Initial 88 fL 79-97 11/09/2020 11/11/2020 [facility]         PLT - Initial 275 x10E3/uL 150-450 11/09/2020 11/11/2020 [facility]         VDRL - Initial Non Reactive NON REACTIVE 11/09/2020 11/11/2020 [facility]         Urine Culture/Screen No growth   11/09/2020 [facility]         HBsAg Negative NEGATIVE 11/09/2020 11/11/2020 [facility]         HIV Counseling/Testing Non Reactive NON REACTIVE 11/09/2020 [facility]/11/2020 [facility]         Chlamydia - Initial Negative NEGATIVE 11/09/2020 [facility]         Gonorrhea - Initial Negative NEGATIVE 11/09/2020 11/16/2020 [facility]         Varicella                   Rubella 9.82 index IMMUNE >0.99 11/09/2020 [facility]/11/2020 [facility]     Optional Labs                 HGB Electrophoresis                   PPD/Quanta                   Pap Test Normal   11/09/2020 11/17/2020 [email or web address]         Cystic Fibrosis                   HPV     11/09/2020 11/16/2020 [facility]                   Familial Dysautonomia                   Genetic Screening Tests                   NST                   TSH *4.680 uIU/mL 0.450-4.500 11/09/2020 11/11/2020 [facility]         TSH     01/12/2021 01/14/2021 [email or web address]         TSH 2.260 uIU/mL 0.450-4.500 04/12/2021 04/14/2021 emcgaw         Drug screen                   HCV Ab     11/09/2020 11/11/2020 [facility]         HCV RNA                   Urinalysis                   Rhogam Injection               8-20 Week Labs                   Ultrasound - Initial                   1st Trimester Aneuploidy Risk Assessment                   MSAFP/Multiple Markers                   2nd Trimester Serum Screening                   Amnio/CVS                   Karyotype                   Amniotic Fluid (AFP)               24-28 Week Labs                   HCT - 24-28 Weeks 32.7 % 34.0-46.6 04/12/2021 04/14/2021 emcgaw         HGB - 24-28 Weeks 10.9 g/dL 11.1-15.9 04/12/2021 04/14/2021 emcgaw         MCV - 24-28 Weeks                   PLT - 24-28 Weeks 226 x10E3/uL 150-450 04/12/2021 04/14/2021 emcgaw         Diabetes Screen 96 mg/dL  02/22/2021 02/23/2021 emcgaw         Diabetes Screen 120 mg/dL  04/12/2021 04/14/2021 emcgaw         GTT (If Screen Abnormal)                   D (Rh) Antibody Screen               32-36 Week Labs                   HCT - 32-36 Weeks                   HGB - 32-36 Weeks                   MCV - 32-36 Weeks                   PLT - 32-36 Weeks                   Ultrasound - 32-36 Weeks                   HIV (When Indicated)                   VDRL - 32-36 Weeks Non Reactive NON REACTIVE 04/12/2021 04/14/2021 emcgaw         Gonorrhea - 32-36 Weeks                   Chlamydia - 32-36 Weeks                   Depression screening (when indicated)               35-37 Week Labs                   Group B Strep Negative NEGATIVE 06/07/2021 06/09/2021 em[facility]aw         Resistance testing if penicillin allergic               Other Labs                   Other               *Asterisk denotes an abnormal result               Past Medical History  Discussed Past Medical History  Abnormal Pap Smear: Y - 9/30/2020 with non [facility] provider  Hypothyroidism: Y  Thyroid Disease: Y  HPI  H&P for scheduled CS for     pregnancy c/b hypothyroidism, obesity, LGA, anemia, depression, migraines. ROS  ROS as noted in the HPI  Physical Exam  Patient is a 79-year-old female.     Constitutional: General Appearance: well developed and nourished and pleasant. Level of Distress: no acute distress. Ambulation: ambulating normally.     Head: Head: normocephalic.     Eyes: Extraocular Movements extraocular movements intact.     Ears, Nose, Mouth, Throat: Ears normal hearing. Nose: no external nose lesions.     Lungs / Chest: Respiratory effort: unlabored.     Cardiovascular: Extremities: no cyanosis.     Abdomen: Liver: gravid.     Neurologic: Gait and Station: normal gait. Sensation: grossly intact. Motor: grossly intact.     Mental Status Exam: Orientation oriented to person, place, and time. Assessment / Plan  1. Depressive disorder -  Discussed counseling vs meds  Declined meds, going to try counseling  O99.343: Other mental disorders complicating pregnancy, third trimester     2. Migraine -  Saw Neurologist - Told silent migraines Per Neuro- MRI delayed til after pregnancy, advised can do now if indicated Also will contact if medications are recommended to review safety  G43.909: Migraine, unspecified, not intractable, without status migrainosus     3.  Anemia -  Hgb 10.9, on fe  M39.053: Anemia complicating pregnancy, third trimester     4. Large for gestation age fetus -  >99%ile at 25wk, 33wk growth 95%tile 32wks EFW > 99%tile Repeat at 36 weeks 92%tile  O36.63X1: Maternal care for excessive fetal growth, third trimester, fetus 1     5. Maternal obesity complicating pregnancy, childbirth and the puerperium, antepartum -  BMI 35  J02.345: Obesity complicating pregnancy, third trimester     6. Hypothyroidism -  TSH 1: 4.6 (synthroid increased to 112mcg qd) , 16wk: 1.35, 3rd trimester labs: TSH 2.260  O99.283: Endocrine, nutritional and metabolic diseases complicating pregnancy, third trimester     7. Breech presentation -  patient has been counseeld re ECV vs CS and prefers CS  O32. 1XX1: Maternal care for breech presentation, fetus 1     8. Gestation period, 39 weeks  Z3A.39: 39 weeks gestation of pregnancy       Return to Office  · for Return OB at [address] on or around 2021  · Mariah Donnelly MD for Surgery at [facility] () on 2021 at 10:00 AM  · for Return OB at [address] on or around 2021  Encounter Sign-Off  Encounter signed-off by Mariah Donnelly MD, 2021. Encounter performed and documented by Mariah Donnelly MD  Encounter reviewed & signed by Mariah Donnelly MD on 2021 at 11:17am     There is not enough information to calculate an E&M code       Audit history   Date of Surgery Update:  Deann Allen was seen and examined. History and physical has been reviewed. The patient has been examined.  There have been no significant clinical changes since the completion of the originally dated History and Physical.  - BSUS done and confirms breech presentation  - consent reviewed, signed and questions answered     Signed By: Lucio Franks MD      2021 9:49 AM                    Patient Demographics    Patient Name   Connor Mendozafast   [id number] Legal Sex   Female    1990 Address   [address] [place] [address] Phone   [phone] (Home)   [phone] (Mobile) *Preferred*   CSN:   [id number]   Admit Date: Admit Time Room Bed   2021  7:51 AM [facility] [[id number]]  [[id number]]   Attending Providers    Provider Pager From To   Celena Richard MD  21   Emergency Contact(s)    Name Relation Home Work beulah Franco Spouse   [phone]   [place] [phone] Father   Jannet [facility] [phone]         DELIVERY CLINICAL     Kathleen Lombardi     MRN: [id number]   Link to Baby  Comment     Last edited by  on  at    Baby's name MRN Account  Age Sex Admission Type   Sumit Honeycutt [id number] 21 3 days M Confirmed Discharge   OB History as of 2021       1    Para        Term                AB        Living          SAB        TAB        Ectopic        Molar        Multiple        Live Births             # Outcome Date GA Labor/2nd Weight Sex Delivery Anes PTL Lv A1 A5   1                  Prenatal History     Most Recent Value   Did You Receive Prenatal Care Yes   Name Of OB Provider Dr. Stevenson Stewart [facility] [place] (Maternal Fetal Medicine)? No   Fetal Ultrasound Abnormalities/Concerns?  No   Infant Feeding Breast Milk   Circumcision Planned Yes   Pediatrician After Birth/ Follow Up Baby Visits [address]   Dating Summary    Working JESIKA: 21 set by Denise Livingston RN on 21 based on Other Basis   Based On JESIKA GA Diff GA User Date   Other Basis  0 Comment: per patient 21 Working  Denise Livingston RN 21   Prenatal Results    Prenatal Labs    Test Value Date Time   ABO/Rh * A Positive  20    Antibody Scrn      Hgb      Hct      Platelets      Rubella * Immune  20    RPR      T. Pallidum Antibody * Non Reactive  20    Urine      Hep B Surf Ag * Negative  20    Hep C      HIV * Non Reactive  20    Gonorrhea * Negative  20    Chlamydia * Negative  20    TSH      GTT, 1 HR (Glucola)      GTT, Fasting      GTT, 1 HR      GTT, 2 HR      GTT, 3 HR      3rd Trimester    Test Value Date Time   Hgb      Hct      Platelets      Group B Strep * Negative  21    Antibody Scrn      TSH      T. Pallidum Antibody      Hep B Surf Ag      Gonorrhea      Chlamydia       Screening/Diagnostics    Test Value Date Time   AFP Only      AFP Tetra      Hgb Electrophoresis      Amniocentesis      Cystic Fibrosis      Thalessemia      Tru-Sachs      Canavan      PAP Smear      Beta HCG      NT      NIPT      COVID-19      Lab    Test Value Date Time   GTT, Fasting      GTT, 1HR      GTT, 2HR      GTT, 3HR      RPR      Beta HCG      CMV Ab      Toxoplasma Ab      Varicella Zoster Ab         Legend    *: Historical  View all results for this pregnancy   Hero Ortega [[id number]]     Delivery    Birth date/time: 2019 10:49:00  Delivery type: , Low Transverse  Trial of labor: No   categorization: Primary   priority: Routine  Indications for : Malposition  Labor Events     labor?: No   steroids?: None  Cervical ripening type: None  Antibiotics during labor?: Yes  Induction: None  Augmentation: None  Labor/Delivery complications: None  Delivery Providers    Delivering clinician: Irina Patel MD  Provider Role   Irina Patel MD Obstetrician   Seaver, Lark Houseman, SINDY Primary Nurse   Gurjit Gray RN Primary [place] Nurse   Zeyad Hunter MD Anesthesiologist   Lesly Del Castillo CRNA CRNA   Anesthesia    Method: Spinal  Multiple Birth Onset Second Stage    No data filed   Operative Delivery    Forceps attempted?: No  Vacuum extractor attempted?: No  Presentation    Presentation: Breech  Apgars    Living status: Living  Apgars:  1 min. :  5 min.:  10 min. :  15 min.:  20 min.:    Skin color:  0  1       Heart rate:  2  2       Reflex irritability:  2  2       Muscle tone:  2  2       Respiratory effort:  2  2       Total:  8  9       Apgars assigned by: KRISTIE CAZARES  Resuscitation    Method: Suctioning-bulb, Suctioning-deep, Tactile Stimulation  Shoulder Dystocia    Shoulder dystocia present?: No  Measurements    Weight: 4355 g  Weight (lbs): 9 lb 9.6 oz  Length: 50.8 cm  Length (in): 20\"  Head circumference: 36.5 cm  Abdominal girth: 35 cm  Lacerations    No data filed   Placenta    Placenta Date/Time: 6/25/2021 1050  Removal: Manual Removal  Appearance: Normal Placenta disposition: Discarded   Vaginal Counts    Skin to Skin    Skin to skin initiated date/time: 6/25/2021 1049  Skin to skin with: Mother  Delivery Summary History    The Delivery Summary has not been signed.

## 2021-08-13 ENCOUNTER — OFFICE VISIT (OUTPATIENT)
Dept: ENDOCRINOLOGY | Age: 31
End: 2021-08-13
Payer: COMMERCIAL

## 2021-08-13 VITALS
DIASTOLIC BLOOD PRESSURE: 59 MMHG | WEIGHT: 220.2 LBS | BODY MASS INDEX: 36.69 KG/M2 | HEIGHT: 65 IN | HEART RATE: 79 BPM | SYSTOLIC BLOOD PRESSURE: 112 MMHG

## 2021-08-13 DIAGNOSIS — E03.9 ACQUIRED HYPOTHYROIDISM: Primary | ICD-10-CM

## 2021-08-13 PROCEDURE — 99244 OFF/OP CNSLTJ NEW/EST MOD 40: CPT | Performed by: INTERNAL MEDICINE

## 2021-08-13 RX ORDER — NORETHINDRONE ACETATE AND ETHINYL ESTRADIOL 1MG-20(21)
KIT ORAL
COMMUNITY

## 2021-08-13 NOTE — PROGRESS NOTES
Chief Complaint   Patient presents with    Thyroid Problem     pcp and pharmacy verified     History of Present Illness: Sanjana Laird is a 36 y.o. female who I was asked to see in consult by Dr. Jonatan Bethea for evaluation of hypothyroidism. [facility] said my thyroid was off and she was no longer able to fix it so she wanted me to see you. \"  Pt notes she just delivered her first child \"7 weeks ago\" and pt notes that \"I was feeling crappy and my Gyn said to come in here. \"    She was first diagnosed with hypothyroidism in 2018, \"I had gained 50 pounds and my doctor tested and found I was hypothyroid\". Prior to her pregnancy she was taking LT4 75mcg. During her pregnancy she was taking 112mcg daily. \"Two days ago they told me to decrease to 1/2 a pill every other day. \"    Pt notes that she had been on the 75mcg for some time but it was \"several months before I got pregnant that I had my thyroid checked\". Pt reports that two weeks ago she started feeling \"crappy\". She reports that she has been feeling extremely tired \"I can not find the energy to do anything and I am always hungry. \" She denies issues of CP, SOB, palpitations, tremors, she notes she is feeling hot all night and cold all day. She denies issues of diarrhea or constipation. She denies issues of lightheadedness, dizziness, pre-syncope or syncope. Her TSH on 8/6/21 was 0.062. \"Before I got pregnant, I was working out 5 days per week, I was eating super healthy and I could not lose weight. When she tested my thyroid labs she said everything looked fine. She said I would have to work out twice as much and eat 500 calories less, which is not realistic. \"    She is not breastfeeding. Her menstrual cycle has not returned. She just restarted OCP on 8/8/21. \"Both of my sisters have hypothyroidism, I know one has hashimoto's and my other just has hypothyroidism. \"  Prior to 2018 she has no prior hx of thyroid issues.     Past Medical History: Diagnosis Date    Anemia     Anxiety     Sepsis ([facility] [address] 2018    Thyroid activity decreased      Past Surgical History:   Procedure Laterality Date    HX APPENDECTOMY  2009    HX OTHER SURGICAL  2008    appendectomy     Current Outpatient Medications   Medication Sig    norethindrone-ethinyl estradiol (Junel FE 1/20, ,) 1 mg-20 mcg (21)/75 mg (7) tab Junel FE 1/20 () 1 mg-20 mcg (21)/75 mg (7) tablet   Take 1 tablet every day by oral route.  ibuprofen (MOTRIN) 800 mg tablet Take 1 Tablet by mouth every eight (8) hours as needed for Pain.  levothyroxine (SYNTHROID) 112 mcg tablet levothyroxine 112 mcg tablet   TAKE 1 TABLET BY MOUTH EVERY DAY     No current facility-administered medications for this visit. No Known Allergies  Family History   Problem Relation Age of Onset    Psychiatric Disorder Mother         severe depression, substance abuse    Hypertension Mother    Ching Coombs COPD Mother         Brianne Desai Other Mother         FM    Hypertension Father     Heart Disease Father         MI age 46    COPD Father         Emphasema    Alcohol abuse Father     Psychiatric Disorder Sister         severe anxiety and depression    Thyroid Disease Sister         Hashimoto's    No Known Problems Brother     Heart Disease Paternal Grandmother 28        Fatal MI age 28    Thyroid Disease Sister     No Known Problems Brother      Social History     Socioeconomic History    Marital status:      Spouse name: Elan Lewis Number of children: Not on file    Years of education: Not on file    Highest education level: Not on file   Occupational History    Not on file   Tobacco Use    Smoking status: Former Smoker     Packs/day: 1.00     Years: 1.00     Pack years: 1.00     Quit date: 3/20/2020     Years since quittin.4    Smokeless tobacco: Never Used   Vaping Use    Vaping Use: Never used   Substance and Sexual Activity    Alcohol use:  Yes     Alcohol/week: 3.0 standard drinks Types: 3 Glasses of wine per week    Drug use: Yes     Frequency: 2.0 times per week     Types: Marijuana    Sexual activity: Yes     Partners: Male     Birth control/protection: Pill   Other Topics Concern     Service No    Blood Transfusions No    Caffeine Concern No    Occupational Exposure No    Hobby Hazards No    Sleep Concern No    Stress Concern No    Weight Concern No    Special Diet No    Back Care No    Exercise No    Bike Helmet No    Seat Belt No    Self-Exams No   Social History Narrative    Not on file     Social Determinants of Health     Financial Resource Strain:     Difficulty of Paying Living Expenses:    Food Insecurity:     Worried About Running Out of Food in the Last Year:     Ran Out of Food in the Last Year:    Transportation Needs:     Lack of Transportation (Medical):      Lack of Transportation (Non-Medical):    Physical Activity:     Days of Exercise per Week:     Minutes of Exercise per Session:    Stress:     Feeling of Stress :    Social Connections:     Frequency of Communication with Friends and Family:     Frequency of Social Gatherings with Friends and Family:     Attends Jainism Services:     Active Member of Clubs or Organizations:     Attends Club or Organization Meetings:     Marital Status:    Intimate Partner Violence:     Fear of Current or Ex-Partner:     Emotionally Abused:     Physically Abused:     Sexually Abused:      Review of Systems:  - Negative, except as noted in HPI    Physical Examination:  Blood pressure (!) 112/59, pulse 79, height 5' 5\" (1.651 m), weight 220 lb 3.2 oz (99.9 kg), unknown if currently breastfeeding.  - General: pleasant, no distress, good eye contact  - HEENT: no exopthalmos, no periorbital edema, no scleral/conjunctival injection, EOMI, no lid lag or stare  - Neck: supple, no thyromegaly, masses, lymph nodes, or carotid bruits, no supraclavicular or dorsocervical fat pads  - Cardiovascular: regular, normal rate, normal S1 and S2, no murmurs/rubs/gallops   - Respiratory: clear to auscultation bilaterally  - Gastrointestinal: soft, nontender, nondistended, no masses, no hepatosplenomegaly  - Musculoskeletal: no proximal muscle weakness in upper or lower extremities  - Integumentary: no acanthosis nigricans, no rashes, no edema  - Neurological: reflexes 2+ at biceps, no tremor  - Psychiatric: normal mood and affect    Data Reviewed:   - TSH 0.068      Assessment/Plan:   1. Acquired hypothyroidism    1) Pt's TSH was 0.068 two days ago on the LT4 112mcg per day but since she just had a baby this could possibly reflect an episode of post-partum thyroiditis, which would cause low TSH, despite the thyroid gland being underactive. We discussed at length the Pituitary-Thyroid Axis, and the function of TSH on the thyroid gland and how it is used to help assess thyroid function in the body. For now, will have pt continue the LT4 112mcg per day and will order a FT4, TT4 and TT3, compare these to her TSH and then repeat her labs in 4 weeks. Based on labs today and then again in 4 weeks we can look for a trend and see how level change, if at all. I explained that the low TSH tends to point to her LT4 dose being too high and if the TSH remains low, then we would need to decrease her LT4 dose. Pt voices understanding and agreement with the plan. RTC 3 months    Follow-up and Dispositions    · Return in about 3 months (around 11/13/2021).          Copy sent to:  Dr. Lisa Santo

## 2021-08-16 DIAGNOSIS — E03.9 ACQUIRED HYPOTHYROIDISM: Primary | ICD-10-CM

## 2021-08-16 LAB
T3 SERPL-MCNC: 136 NG/DL (ref 71–180)
T4 FREE SERPL-MCNC: 1.33 NG/DL (ref 0.82–1.77)
T4 SERPL-MCNC: 9 UG/DL (ref 4.5–12)
THYROGLOB AB SERPL-ACNC: <1 IU/ML (ref 0–0.9)
THYROPEROXIDASE AB SERPL-ACNC: 184 IU/ML (ref 0–34)

## 2021-08-23 ENCOUNTER — OFFICE VISIT (OUTPATIENT)
Dept: INTERNAL MEDICINE CLINIC | Age: 31
End: 2021-08-23
Payer: COMMERCIAL

## 2021-08-23 VITALS
HEART RATE: 62 BPM | RESPIRATION RATE: 20 BRPM | HEIGHT: 65 IN | DIASTOLIC BLOOD PRESSURE: 53 MMHG | TEMPERATURE: 98.2 F | WEIGHT: 225 LBS | BODY MASS INDEX: 37.49 KG/M2 | OXYGEN SATURATION: 99 % | SYSTOLIC BLOOD PRESSURE: 106 MMHG

## 2021-08-23 DIAGNOSIS — I49.9 IRREGULAR HEART RATE: Primary | ICD-10-CM

## 2021-08-23 PROBLEM — Z30.41 USES ORAL CONTRACEPTIVES: Status: ACTIVE | Noted: 2017-04-03

## 2021-08-23 PROCEDURE — 99214 OFFICE O/P EST MOD 30 MIN: CPT | Performed by: PHYSICIAN ASSISTANT

## 2021-08-23 PROCEDURE — 93000 ELECTROCARDIOGRAM COMPLETE: CPT | Performed by: PHYSICIAN ASSISTANT

## 2021-08-23 RX ORDER — BISMUTH SUBSALICYLATE 262 MG
1 TABLET,CHEWABLE ORAL DAILY
COMMUNITY
End: 2021-12-20

## 2021-08-23 NOTE — PROGRESS NOTES
Chief Complaint   Patient presents with    Thyroid Problem     she is a 36y.o. year old female who presents for evaluation. Patient presents to the office for evaluation of feeling cold with intermittent chills. She reports this is not new the feeling of cold but she has also noticed that she has a low resting heart rate. She reports rates in the 50's and 60's. She also reports she has had times of fuzziness and some dizziness. The patient states the symptoms seems worse at night. She also reports that she seems to notice it more at night. She shares that she is in the process of being followed by the endocrinologist for her thyroid. Reviewed PmHx, RxHx, FmHx, SocHx, AllgHx and updated and dated in the chart. Review of Systems - negative except as listed above    Objective:     Vitals:    08/23/21 0737   BP: (!) 106/53   Pulse: 62   Resp: 20   Temp: 98.2 °F (36.8 °C)   TempSrc: Temporal   SpO2: 99%   Weight: 225 lb (102.1 kg)   Height: 5' 5\" (1.651 m)     Physical Examination: General appearance - alert, well appearing, and in no distress and overweight  Mental status - normal mood, behavior, speech, dress, motor activity, and thought processes  Chest - clear to auscultation, no wheezes, rales or rhonchi, symmetric air entry  Heart - normal rate and regular rhythm, no murmurs noted  Extremities - no pedal edema noted, intact peripheral pulses    Assessment/ Plan:   Diagnoses and all orders for this visit:    1. Irregular heart rate  -     REFERRAL TO CARDIOLOGY  -     METABOLIC PANEL, COMPREHENSIVE; Future  -     CBC WITH AUTOMATED DIFF; Future     I would like for the patient to get some labs done and she has been given a referral to see the cardiologist. She will be contact with the results of the labs once back. She will continue to work with the endocrinologist in reference to her thyroid.    Total encounter time was 30 minutes; over 50% of the time was spent counseling and coordinating care regarding irregular heart rate and following up with endocrinology for her thyroid. I have discussed the diagnosis with the patient and the intended plan as seen in the above orders. The patient has received an after-visit summary and questions were answered concerning future plans.      Medication Side Effects and Warnings were discussed with patient: yes  Patient Labs were reviewed and or requested: yes  Patient Past Records were reviewed and or requested  yes    Sujatha Johnson PA-C

## 2021-08-23 NOTE — PROGRESS NOTES
1. Have you been to the ER, urgent care clinic since your last visit? Hospitalized since your last visit? Yes, [facility], Baby    2. Have you seen or consulted any other health care providers outside of the [address] [place] [address] since your last visit? Include any pap smears or colon screening. Yes, Endo ran thyroid levels. Health Maintenance Due   Topic Date Due    Hepatitis C Screening  Never done    COVID-19 Vaccine (1) Never done    PAP AKA CERVICAL CYTOLOGY  03/28/2017     Patient feeling hot and cold, Hr resting 57-60, then patient cold, 100 hr and feeling hot, random dizziness.

## 2021-09-15 DIAGNOSIS — E03.9 ACQUIRED HYPOTHYROIDISM: ICD-10-CM

## 2021-09-17 ENCOUNTER — OFFICE VISIT (OUTPATIENT)
Dept: CARDIOLOGY CLINIC | Age: 31
End: 2021-09-17
Payer: COMMERCIAL

## 2021-09-17 VITALS
WEIGHT: 226 LBS | SYSTOLIC BLOOD PRESSURE: 130 MMHG | HEIGHT: 65 IN | DIASTOLIC BLOOD PRESSURE: 88 MMHG | HEART RATE: 88 BPM | OXYGEN SATURATION: 98 % | BODY MASS INDEX: 37.65 KG/M2

## 2021-09-17 DIAGNOSIS — I49.9 IRREGULAR HEART BEAT: Primary | ICD-10-CM

## 2021-09-17 PROCEDURE — 99203 OFFICE O/P NEW LOW 30 MIN: CPT | Performed by: INTERNAL MEDICINE

## 2021-09-17 PROCEDURE — 93000 ELECTROCARDIOGRAM COMPLETE: CPT | Performed by: INTERNAL MEDICINE

## 2021-09-17 NOTE — PROGRESS NOTES
Chief Complaint   Patient presents with    Irregular Heart Beat    Other     FAMILY HX OF HEART DISEASE     Visit Vitals  /88 (BP 1 Location: Left upper arm, BP Patient Position: Sitting)   Pulse 88   Ht 5' 5\" (1.651 m)   Wt 226 lb (102.5 kg)   SpO2 98%   BMI 37.61 kg/m²     Chest pain denied   Palpations denied  SOB denied  Dizziness SEVERAL TIMES THROUGHOUT THE DAY. NOT ASSOCIATED WITH ANY PARTICULAR ACTIVITY OR MOVEMENT CHANGES  Swelling in hands/feet denied   Recent hospital stays C/S June 25TH,

## 2021-09-17 NOTE — PROGRESS NOTES
All orders entered per verbal orders of Dr. Zena Pat. Holter- Bradycardia, Dizziness. Phone followup of the test results.

## 2021-09-17 NOTE — PROGRESS NOTES
Reason to see patient: Dizziness. HPI: Fernando Chavez is a 36 y.o. female with past medical history significant for anxiety recently delivered a baby about 3 months ago after . Recently she started noticing having episodic dizziness and when she checked her pulse that her heart rate was low in 50s. The symptoms of dizziness lasted only for few seconds and spontaneously resolved. No other symptoms of chest pain, shortness of breath, lightheadedness or passing out episodes. No previous cardiac history. No significant previous cardiac testing. Denies tobacco use. Family history significant for congestive heart failure in father. She has background history of hypothyroidism and takes Synthroid. Her TSH was recently low. She is getting repeat TSH redrawn. EKG in my office today demonstrated normal sinus rhythm with heart rate of 75 bpm with sinus arrhythmia with normal axis with normal intervals with normal ST segment. Plan:    1. Dizziness/bradycardia: Heart rate personally recorded in low 50s. Unlikely there is cardiac cause of dizziness however will check a 24-hour Holter monitor for bradycardia. 2.  History of hypothyroidism: She is getting frequent follow-up and surveillance. 3. Post partum 3months post C section    4. Phone follow-up of the test results. ATTENTION:   This medical record was transcribed using an electronic medical records/speech recognition system. Although proofread, it may and can contain electronic, spelling and other errors. Corrections may be executed at a later time. Please feel free to contact us for any clarifications as needed.       Past Medical History:   Diagnosis Date    Anemia     Anxiety     Sepsis ([address] 2018    Thyroid activity decreased             Past Surgical History:   Procedure Laterality Date    HX APPENDECTOMY  2009    HX OTHER SURGICAL  2008    appendectomy             Family History   Problem Relation Age of Onset    Psychiatric Disorder Mother         severe depression, substance abuse    Hypertension Mother    Crista Granado COPD Mother         Unique Zaragoza Other Mother         FM    Hypertension Father     Heart Disease Father         MI age 46    COPD Father         Emphasema    Alcohol abuse Father     Psychiatric Disorder Sister         severe anxiety and depression    Thyroid Disease Sister         Hashimoto's    No Known Problems Brother     Heart Disease Paternal Grandmother 28        Fatal MI age 28    Thyroid Disease Sister     No Known Problems Brother            Social History     Socioeconomic History    Marital status:      Spouse name: Cecille Marmolejo Number of children: Not on file    Years of education: Not on file    Highest education level: Not on file   Occupational History    Not on file   Tobacco Use    Smoking status: Former Smoker     Packs/day: 1.00     Years: 1.00     Pack years: 1.00     Quit date: 3/20/2020     Years since quittin.4    Smokeless tobacco: Never Used   Vaping Use    Vaping Use: Never used   Substance and Sexual Activity    Alcohol use:  Yes     Alcohol/week: 3.0 standard drinks     Types: 3 Glasses of wine per week    Drug use: Yes     Frequency: 2.0 times per week     Types: Marijuana    Sexual activity: Yes     Partners: Male     Birth control/protection: Pill   Other Topics Concern     Service No    Blood Transfusions No    Caffeine Concern No    Occupational Exposure No    Hobby Hazards No    Sleep Concern No    Stress Concern No    Weight Concern No    Special Diet No    Back Care No    Exercise No    Bike Helmet No    Seat Belt No    Self-Exams No   Social History Narrative    Not on file     Social Determinants of Health     Financial Resource Strain:     Difficulty of Paying Living Expenses:    Food Insecurity:     Worried About Running Out of Food in the Last Year:     Sharyn of Food in the Last Year:    Transportation Needs:     Lack of Transportation (Medical):  Lack of Transportation (Non-Medical):    Physical Activity:     Days of Exercise per Week:     Minutes of Exercise per Session:    Stress:     Feeling of Stress :    Social Connections:     Frequency of Communication with Friends and Family:     Frequency of Social Gatherings with Friends and Family:     Attends Taoist Services:     Active Member of Clubs or Organizations:     Attends Club or Organization Meetings:     Marital Status:    Intimate Partner Violence:     Fear of Current or Ex-Partner:     Emotionally Abused:     Physically Abused:     Sexually Abused:          No Known Allergies         Current Outpatient Medications   Medication Sig Dispense Refill    multivitamin (ONE A DAY) tablet Take 1 Tablet by mouth daily.  norethindrone-ethinyl estradiol (Junel FE 1/20, 28,) 1 mg-20 mcg (21)/75 mg (7) tab Junel FE 1/20 (28) 1 mg-20 mcg (21)/75 mg (7) tablet   Take 1 tablet every day by oral route.  levothyroxine (SYNTHROID) 112 mcg tablet levothyroxine 112 mcg tablet   TAKE 1 TABLET BY MOUTH EVERY DAY          ROS:  12 point review of systems was performed.  All negative except for HPI     Physical Exam:  Visit Vitals  /88 (BP 1 Location: Left upper arm, BP Patient Position: Sitting)   Pulse 88   Ht 5' 5\" (1.651 m)   Wt 226 lb (102.5 kg)   SpO2 98%   BMI 37.61 kg/m²       Gen:  Well-developed, well-nourished, in no acute distress  HEENT:  Pink conjunctivae, PERRL, hearing intact to voice, moist mucous membranes  Neck:  Supple, without masses, thyroid non-tender  Resp:  No accessory muscle use, clear breath sounds without wheezes rales or rhonchi  Card:  No murmurs, normal S1, S2 without thrills, bruits or peripheral edema  Abd:  Soft, non-tender, non-distended, normoactive bowel sounds are present, no palpable organomegaly and no detectable hernias  Lymph:  No cervical or inguinal adenopathy  Musc:  No cyanosis or clubbing  Skin:  No rashes or ulcers, skin turgor is good  Neuro:  Cranial nerves are grossly intact, no focal motor weakness, follows commands appropriately  Psych:  Good insight, oriented to person, place and time, alert     Labs:     Lab Results   Component Value Date/Time    WBC 7.5 08/23/2021 08:24 AM    HGB 12.0 08/23/2021 08:24 AM    HCT 39.5 08/23/2021 08:24 AM    PLATELET 455 73/44/0396 08:24 AM    MCV 88.0 08/23/2021 08:24 AM     Lab Results   Component Value Date/Time    Glucose 83 08/23/2021 08:24 AM    LDL, calculated 166 (H) 02/06/2019 09:16 AM    Creatinine 0.73 08/23/2021 08:24 AM      Lab Results   Component Value Date/Time    Cholesterol, total 249 (H) 02/06/2019 09:16 AM    HDL Cholesterol 52 02/06/2019 09:16 AM    LDL, calculated 166 (H) 02/06/2019 09:16 AM    Triglyceride 157 (H) 02/06/2019 09:16 AM     Lab Results   Component Value Date/Time    ALT (SGPT) 16 08/23/2021 08:24 AM    Alk.  phosphatase 41 (L) 08/23/2021 08:24 AM    Bilirubin, total 0.2 08/23/2021 08:24 AM    Albumin 3.6 08/23/2021 08:24 AM    Protein, total 7.2 08/23/2021 08:24 AM    PLATELET 461 49/18/6407 08:24 AM     No results found for: INR, INREXT, PTMR, PTP, PT1, PT2, INREXT   Lab Results   Component Value Date/Time    GFR est non-AA >60 08/23/2021 08:24 AM    GFR est AA >60 08/23/2021 08:24 AM    Creatinine 0.73 08/23/2021 08:24 AM    BUN 17 08/23/2021 08:24 AM    Sodium 138 08/23/2021 08:24 AM    Potassium 4.7 08/23/2021 08:24 AM    Chloride 106 08/23/2021 08:24 AM    CO2 27 08/23/2021 08:24 AM     No results found for: PSA, PSA2, [place] [facility], [id number], [id number]  Lab Results   Component Value Date/Time    TSH 2.74 05/21/2020 07:57 AM    T4, Free 1.33 08/13/2021 12:00 AM    T4, Total 9.0 08/13/2021 12:00 AM      Lab Results   Component Value Date/Time    Glucose 83 08/23/2021 08:24 AM      No results found for: CPK, RCK1, RCK2, RCK3, RCK4, CKMB, CKNDX, CKND1, TROPT, TROIQ, BNPP, BNP   No results found for: BNP, BNPP, BNPPPOC, XBNPT, BNPNT   Lab Results   Component Value Date/Time    Sodium 138 08/23/2021 08:24 AM    Potassium 4.7 08/23/2021 08:24 AM    Chloride 106 08/23/2021 08:24 AM    CO2 27 08/23/2021 08:24 AM    Anion gap 5 08/23/2021 08:24 AM    Glucose 83 08/23/2021 08:24 AM    BUN 17 08/23/2021 08:24 AM    Creatinine 0.73 08/23/2021 08:24 AM    BUN/Creatinine ratio 23 (H) 08/23/2021 08:24 AM    GFR est AA >60 08/23/2021 08:24 AM    GFR est non-AA >60 08/23/2021 08:24 AM    Calcium 9.0 08/23/2021 08:24 AM      Lab Results   Component Value Date/Time    Sodium 138 08/23/2021 08:24 AM    Potassium 4.7 08/23/2021 08:24 AM    Chloride 106 08/23/2021 08:24 AM    CO2 27 08/23/2021 08:24 AM    Anion gap 5 08/23/2021 08:24 AM    Glucose 83 08/23/2021 08:24 AM    BUN 17 08/23/2021 08:24 AM    Creatinine 0.73 08/23/2021 08:24 AM    BUN/Creatinine ratio 23 (H) 08/23/2021 08:24 AM    GFR est AA >60 08/23/2021 08:24 AM    GFR est non-AA >60 08/23/2021 08:24 AM    Calcium 9.0 08/23/2021 08:24 AM    Bilirubin, total 0.2 08/23/2021 08:24 AM    ALT (SGPT) 16 08/23/2021 08:24 AM    Alk. phosphatase 41 (L) 08/23/2021 08:24 AM    Protein, total 7.2 08/23/2021 08:24 AM    Albumin 3.6 08/23/2021 08:24 AM    Globulin 3.6 08/23/2021 08:24 AM    A-G Ratio 1.0 (L) 08/23/2021 08:24 AM      No results found for: HBA1C, KTV9OXTB, RRM1BZJA, MCT4IHJC      No results for input(s): CPK, CKMB, TROIQ in the last 72 hours. No lab exists for component: CKQMB, CPKMB        Problem List:     Problem List  Date Reviewed: 8/23/2021        Codes Class Noted    Breech presentation ICD-10-CM: O32. 1XX0  ICD-9-CM: 652.20  6/25/2021        Term pregnancy ICD-10-CM: Z34.90  ICD-9-CM: V22.1  6/25/2021        Acquired hypothyroidism ICD-10-CM: E03.9  ICD-9-CM: 244.9  2/15/2019        Severe obesity ([facility] [address] ICD-10-CM: E66.01  ICD-9-CM: 278.01  1/7/2019        Uses oral contraceptives ICD-10-CM: Z30.41  ICD-9-CM: V25.41  4/3/2017        Anxiety ICD-10-CM: F41.9  ICD-9-CM: 300.00  Unknown [facility] hx-ischem heart disease ICD-10-CM: Z82.49  ICD-9-CM: V17.3  2/23/2017                Chinedu Dougherty MD, [facility]

## 2021-09-18 LAB
T4 FREE SERPL-MCNC: 1.47 NG/DL (ref 0.82–1.77)
T4 SERPL-MCNC: 15.4 UG/DL (ref 4.5–12)
TSH SERPL DL<=0.005 MIU/L-ACNC: 0.29 UIU/ML (ref 0.45–4.5)

## 2021-09-20 DIAGNOSIS — E03.9 ACQUIRED HYPOTHYROIDISM: Primary | ICD-10-CM

## 2021-09-27 ENCOUNTER — OFFICE VISIT (OUTPATIENT)
Dept: INTERNAL MEDICINE CLINIC | Age: 31
End: 2021-09-27
Payer: COMMERCIAL

## 2021-09-27 VITALS
HEIGHT: 65 IN | BODY MASS INDEX: 37.99 KG/M2 | RESPIRATION RATE: 16 BRPM | HEART RATE: 75 BPM | TEMPERATURE: 98.3 F | DIASTOLIC BLOOD PRESSURE: 72 MMHG | SYSTOLIC BLOOD PRESSURE: 119 MMHG | WEIGHT: 228 LBS

## 2021-09-27 DIAGNOSIS — G43.101 MIGRAINE WITH AURA AND WITH STATUS MIGRAINOSUS, NOT INTRACTABLE: Primary | ICD-10-CM

## 2021-09-27 PROCEDURE — 99213 OFFICE O/P EST LOW 20 MIN: CPT | Performed by: PHYSICIAN ASSISTANT

## 2021-09-27 RX ORDER — CETIRIZINE HCL 10 MG
10 TABLET ORAL
COMMUNITY
End: 2021-12-20

## 2021-09-27 RX ORDER — ONDANSETRON 4 MG/1
4 TABLET, FILM COATED ORAL
Qty: 30 TABLET | Refills: 1 | Status: SHIPPED | OUTPATIENT
Start: 2021-09-27

## 2021-09-27 RX ORDER — SUMATRIPTAN 100 MG/1
TABLET, FILM COATED ORAL
Qty: 12 TABLET | Refills: 5 | Status: SHIPPED | OUTPATIENT
Start: 2021-09-27

## 2021-09-27 NOTE — PROGRESS NOTES
1. Have you been to the ER, urgent care clinic since your last visit? Hospitalized since your last visit? No    2. Have you seen or consulted any other health care providers outside of the [address] [place] [address] since your last visit? Include any pap smears or colon screening.  No   Chief Complaint   Patient presents with    Headache     since Sunday

## 2021-09-27 NOTE — PROGRESS NOTES
Sobeida Krueger is a 36 y.o. female  Chief Complaint   Patient presents with    Headache     since Sunday     Visit Vitals  /72   Pulse 75   Temp 98.3 °F (36.8 °C) (Temporal)   Resp 16   Ht 5' 5\" (1.651 m)   Wt 228 lb (103.4 kg)   BMI 37.94 kg/m²      Health Maintenance Due   Topic Date Due    Hepatitis C Screening  Never done    COVID-19 Vaccine (1) Never done    Pap Smear  03/28/2019    Flu Vaccine (1) Never done       HPI  Started Sunday at 1 AM. Worse R forehead. Tried Tylenol/Midol with caffeine/hydrating. No change. Had first migraine with aura while pregnant 6 months ago. No other migraines. No visual changes with this headache.   + light & noise sensitivity. + Nausea, but no vomiting. No recent nasal congestion. Son 1 months old - healthy and doing well. Not breastfeeding. ROS  Review of Systems   Constitutional: Negative for fever. Respiratory: Negative for shortness of breath. Cardiovascular: Negative for chest pain and palpitations. Neurological: Negative for dizziness, loss of consciousness and weakness. EXAM  Physical Exam  Vitals and nursing note reviewed. Constitutional:       General: She is not in acute distress. Appearance: She is well-developed. HENT:      Head: Normocephalic and atraumatic. Right Ear: Tympanic membrane normal.      Left Ear: Tympanic membrane normal.      Nose: Nose normal.      Mouth/Throat:      Mouth: Mucous membranes are moist.      Pharynx: Oropharynx is clear. Eyes:      Extraocular Movements: Extraocular movements intact. Pupils: Pupils are equal, round, and reactive to light. Comments: Squinting in light. Neck:      Vascular: No carotid bruit or JVD. Cardiovascular:      Rate and Rhythm: Normal rate and regular rhythm. Heart sounds: Normal heart sounds. Pulmonary:      Effort: Pulmonary effort is normal. No respiratory distress. Breath sounds: Normal breath sounds.    Musculoskeletal:      Cervical back: Neck supple. Skin:     General: Skin is warm and dry. Neurological:      Mental Status: She is alert and oriented to person, place, and time. Psychiatric:         Mood and Affect: Mood normal.         Behavior: Behavior normal.         Thought Content: Thought content normal.         Judgment: Judgment normal.       ASSESSMENT/PLAN  Encounter Diagnoses     ICD-10-CM ICD-9-CM   1.  Migraine with aura and with status migrainosus, not intractable  G43.101 346.03     Orders Placed This Encounter    CT HEAD WO CONT    SUMAtriptan (IMITREX) 100 mg tablet    cetirizine (ZYRTEC) 10 mg tablet    ondansetron hcl (ZOFRAN) 4 mg tablet

## 2021-10-08 ENCOUNTER — HOSPITAL ENCOUNTER (OUTPATIENT)
Dept: CT IMAGING | Age: 31
Discharge: HOME | End: 2021-10-08
Attending: PHYSICIAN ASSISTANT
Payer: COMMERCIAL

## 2021-10-08 DIAGNOSIS — G43.101 MIGRAINE WITH AURA AND WITH STATUS MIGRAINOSUS, NOT INTRACTABLE: ICD-10-CM

## 2021-10-08 PROCEDURE — 70450 CT HEAD/BRAIN W/O DYE: CPT

## 2021-11-09 RX ORDER — LEVOTHYROXINE SODIUM 112 UG/1
TABLET ORAL
Qty: 30 TABLET | Refills: 2 | Status: SHIPPED | OUTPATIENT
Start: 2021-11-09 | End: 2021-12-20 | Stop reason: SDUPTHER

## 2021-11-09 NOTE — TELEPHONE ENCOUNTER
----- Message from Augustus Paulino sent at 11/8/2021  7:09 PM EST -----  Regarding: Prescription Question  Contact: [phone]  Zoie Silva,    My Levothyroxine runs out this Friday. Would you be able to renew the prescription?
Per last lab results: I want to decrease your Levothyroxine dose to one pill Mon-Sat and nothing on Sunday.
The patient is a 45y Male complaining of chest pain.

## 2021-12-01 DIAGNOSIS — E03.9 ACQUIRED HYPOTHYROIDISM: ICD-10-CM

## 2021-12-17 LAB
FT4I SERPL CALC-MCNC: 2.2 (ref 1.2–4.9)
T3RU NFR SERPL: 18 % (ref 24–39)
T4 FREE SERPL-MCNC: 1.23 NG/DL (ref 0.82–1.77)
T4 SERPL-MCNC: 12.1 UG/DL (ref 4.5–12)
TSH SERPL DL<=0.005 MIU/L-ACNC: 2.9 UIU/ML (ref 0.45–4.5)

## 2021-12-20 ENCOUNTER — OFFICE VISIT (OUTPATIENT)
Dept: ENDOCRINOLOGY | Age: 31
End: 2021-12-20
Payer: COMMERCIAL

## 2021-12-20 VITALS
SYSTOLIC BLOOD PRESSURE: 119 MMHG | DIASTOLIC BLOOD PRESSURE: 60 MMHG | WEIGHT: 242.6 LBS | HEART RATE: 69 BPM | BODY MASS INDEX: 40.42 KG/M2 | HEIGHT: 65 IN

## 2021-12-20 DIAGNOSIS — E03.9 ACQUIRED HYPOTHYROIDISM: Primary | ICD-10-CM

## 2021-12-20 PROCEDURE — 99214 OFFICE O/P EST MOD 30 MIN: CPT | Performed by: INTERNAL MEDICINE

## 2021-12-20 RX ORDER — LEVOTHYROXINE SODIUM 112 UG/1
TABLET ORAL
Qty: 90 TABLET | Refills: 3 | Status: SHIPPED | OUTPATIENT
Start: 2021-12-20 | End: 2022-05-31

## 2021-12-20 NOTE — LETTER
12/20/2021    Patient: Celia Monte   YOB: 1990   Date of Visit: 12/20/2021     Sundeep Pollard MD  [id number]  [place]ine [place]ilario    Dear Sundeep Pollard MD,      Thank you for referring Ms. Celia Monte to [address] [place] [address] for evaluation. My notes for this consultation are attached. If you have questions, please do not hesitate to call me. I look forward to following your patient along with you.       Sincerely,    Digna Rod MD

## 2021-12-20 NOTE — PROGRESS NOTES
Chief Complaint   Patient presents with    Thyroid Problem     pcp and pharmacy verified     History of Present Illness: Jayshree Tejada is a 36 y.o. female here for follow up of hypothyroidism. [facility] said my thyroid was off and she was no longer able to fix it so she wanted me to see you. \"    Prior to our initial visit on 8/13/21 she had just delivered her first child 7 weeks ago. She was first diagnosed with hypothyroidism in 2018, \"I had gained 50 pounds and my doctor tested and found I was hypothyroid\". Prior to her pregnancy she was taking LT4 75mcg. During her pregnancy she was taking 112mcg daily. After our initial visit I instructed her to take the LT4 112mcg daily. Six weeks later I decreased her to one pill Mon-Sat and nothing on Sun. Her TSH last week was 2.90 with TT4 of 12.1 and FT4 of 1.23. Pt denies any recent illnesses, injuries or hospitalizations. She has not had any COVID vaccinations. Pt is still taking the LT4 112mcg Mon-Sat and nothing on Sun. She is no longer breastfeeding. She is on OCP, her LMP was \"about 28 days ago, I am supposed to start tomorrow\". She notes she has been spotting for a couple of weeks. She denies issues of CP, SOB, palpitations, tremors, she notes she will have occasional hot flashes. She denies issues of diarrhea or constipation. She denies issues of lightheadedness, dizziness, pre-syncope or syncope. She denies issues of dysphagia or dysphonia. Current Outpatient Medications   Medication Sig    levothyroxine (SYNTHROID) 112 mcg tablet Take one tablet Monday through Saturday only. Skip Sunday.  SUMAtriptan (IMITREX) 100 mg tablet Take one by mouth at migraine onset, and one 2 hours later if not entirely symptom-free. Max: 2 tabs in 24 hours    ondansetron hcl (ZOFRAN) 4 mg tablet Take 1 Tablet by mouth every eight (8) hours as needed for Nausea or Vomiting.     norethindrone-ethinyl estradiol (Junel FE 1/20, 28,) 1 mg-20 mcg (21)/75 mg (7) tab Junel FE 1/20 (28) 1 mg-20 mcg (21)/75 mg (7) tablet   Take 1 tablet every day by oral route. No current facility-administered medications for this visit. No Known Allergies  Review of Systems:  - Cardiovascular: no chest pain  - Neurological: no tremors  - Integumentary: skin is normal    Physical Examination:  Blood pressure 119/60, pulse 69, height 5' 5\" (1.651 m), weight 242 lb 9.6 oz (110 kg), unknown if currently breastfeeding.  - General: pleasant, no distress, good eye contact   - Neck: no thyromegaly or thyroid bruits  - Cardiovascular: regular, normal rate, nl s1 and s2, no m/r/g   - Integumentary: skin is normal, no edema  - Neurological: reflexes 2+ at biceps, no tremors  - Psychiatric: normal mood and affect    Data Reviewed:   Component      Latest Ref Rng & Units 12/16/2021 12/16/2021           3:40 PM  3:40 PM   TSH      0.450 - 4.500 uIU/mL  2.900   T4, Total      4.5 - 12.0 ug/dL  12.1 (H)   T3 Uptake      24 - 39 %  18 (L)   Free Thyroxine Index (FTI)      1.2 - 4.9  2.2   T4, Free      0.82 - 1.77 ng/dL 1.23        Assessment/Plan:   1) Hypothyroidism > Pt is clinically and biochemically euthyroid on the LT4 112mcg Mon-Sat. Pt instructed to continue this current dose. Because she is on OCP I will continue to monitor the TT4 and FT4. Her TSH is technically over 2.5, but she was frankly hyperthyroid on the LT4 112mcg 7 pills weekly and her TT4 was at the upper range of normal.      Pt voices understanding and agreement with the plan. RTC 6 months.       Copy sent to:  Dr. Prasanna Pagan

## 2022-01-18 ENCOUNTER — TELEPHONE (OUTPATIENT)
Dept: ENDOCRINOLOGY | Age: 32
End: 2022-01-18

## 2022-01-18 NOTE — TELEPHONE ENCOUNTER
Pt called and LVM 1/18 @11:09 am. Stated that dr Ludwin Morales is no loner in her network and had to find a new dr. She needs her records faxed to new dr @ [facility] fax # [phone]. Pt# [phone].

## 2022-01-18 NOTE — TELEPHONE ENCOUNTER
Advised patient that we need a record release signed with her signature requesting that her records get transferred to new MD. She could also ask [facility] to fax a request to fax her records. Patient expressed understanding.

## 2022-01-25 ENCOUNTER — DOCUMENTATION ONLY (OUTPATIENT)
Dept: ENDOCRINOLOGY | Age: 32
End: 2022-01-25

## 2022-03-18 PROBLEM — E66.01 SEVERE OBESITY (HCC): Status: ACTIVE | Noted: 2019-01-07

## 2022-03-19 PROBLEM — Z82.49 FAM HX-ISCHEM HEART DISEASE: Status: ACTIVE | Noted: 2017-02-23

## 2022-03-19 PROBLEM — Z30.41 USES ORAL CONTRACEPTIVES: Status: ACTIVE | Noted: 2017-04-03

## 2022-03-19 PROBLEM — Z34.90 TERM PREGNANCY: Status: ACTIVE | Noted: 2021-06-25

## 2022-03-20 PROBLEM — E03.9 ACQUIRED HYPOTHYROIDISM: Status: ACTIVE | Noted: 2019-02-15

## 2022-05-03 NOTE — DISCHARGE SUMMARY
Obstetrical Discharge Summary     Name: Juni Paula MRN: [id number]  SSN: [id number]    YOB: 1990  Age: 31 y.o. Sex: female      Admit Date: 2021    Discharge Date: 2021     Admitting Physician: Myles Johnson MD     Attending Physician:  Corrine Barlow MD     Admission Diagnoses: Term pregnancy [Z34.90]  Breech presentation [O32.1XX0]    Discharge Diagnoses:   Information for the patient's :  Chad Brambila [[id number]]   Delivery of a 4.355 kg male infant via , Low Transverse on 2021 at 10:49 AM  by Myles Johnson. Apgars were 8  and 9 . Additional Diagnoses:   Hospital Problems  Date Reviewed: 3/4/2020        Codes Class Noted POA    Breech presentation ICD-10-CM: O32. 1XX0  ICD-9-CM: 652.20  2021 Unknown        Term pregnancy ICD-10-CM: Z34.90  ICD-9-CM: V22.1  2021 Unknown             Lab Results   Component Value Date/Time    Rubella, External Immune 2020 12:00 AM    GrBStrep, External Negative 2021 12:00 AM       Hospital Course: Normal hospital course following the delivery. Disposition at Discharge: Home or self care    Discharged Condition: Stable    Patient Instructions:   Current Discharge Medication List      START taking these medications    Details   ibuprofen (MOTRIN) 800 mg tablet Take 1 Tablet by mouth every eight (8) hours as needed for Pain. Qty: 30 Tablet, Refills: 0      oxyCODONE-acetaminophen (PERCOCET) 5-325 mg per tablet Take 2 Tablets by mouth every four (4) hours as needed for Pain for up to 5 days. Max Daily Amount: 12 Tablets. Qty: 30 Tablet, Refills: 0    Associated Diagnoses:  delivery delivered         CONTINUE these medications which have NOT CHANGED    Details   PNV YT.42/MSVGYAW fum/folic ac (PRENATAL PO) Take  by mouth.       ferrous sulfate (IRON PO) Take 1 Tablet by mouth.      levothyroxine (SYNTHROID) 112 mcg tablet levothyroxine 112 mcg tablet   TAKE 1 TABLET BY MOUTH EVERY DAY Patient given Phenergan 25 mg po for recurrent nausea.   STOP taking these medications       acetaminophen (TylenoL) 325 mg tablet Comments:   Reason for Stopping:         diphenhydrAMINE (BenadryL) 25 mg capsule Comments:   Reason for Stopping:         aspirin delayed-release 81 mg tablet Comments:   Reason for Stopping:         lidocaine (XYLOCAINE) 2 % solution Comments:   Reason for Stopping:         JUNEL FE 1/20, 28, 1 mg-20 mcg (21)/75 mg (7) tab Comments:   Reason for Stopping:               Reference my discharge instructions. Follow-up Appointments   Procedures    FOLLOW UP VISIT Appointment in: One Week Mood check     Mood check     Standing Status:   Standing     Number of Occurrences:   1     Order Specific Question:   Appointment in     Answer: One Week        Signed By:  Lucio Franks MD     June 27, 2021                      .

## 2022-06-01 DIAGNOSIS — E03.9 ACQUIRED HYPOTHYROIDISM: ICD-10-CM

## 2022-06-24 ENCOUNTER — TELEPHONE (OUTPATIENT)
Dept: INTERNAL MEDICINE CLINIC | Age: 32
End: 2022-06-24

## 2022-06-24 RX ORDER — LEVOTHYROXINE SODIUM 112 UG/1
TABLET ORAL
Qty: 90 TABLET | Refills: 3 | Status: SHIPPED | OUTPATIENT
Start: 2022-06-24

## 2022-06-24 NOTE — TELEPHONE ENCOUNTER
CT HEAD WO CONT on 10/08/2021 was denied; Provider may call for verbal [id number]. Follow prompts.

## 2023-06-19 ENCOUNTER — TELEPHONE (OUTPATIENT)
Age: 33
End: 2023-06-19

## 2023-06-19 RX ORDER — LEVOTHYROXINE SODIUM 112 UG/1
TABLET ORAL
Qty: 90 TABLET | Refills: 0 | Status: SHIPPED | OUTPATIENT
Start: 2023-06-19

## 2023-06-21 ENCOUNTER — CLINICAL DOCUMENTATION (OUTPATIENT)
Age: 33
End: 2023-06-21

## 2023-06-21 NOTE — TELEPHONE ENCOUNTER
ANA    I called patient, she said she's no longer a patient of Dr. Mich Etienne. She said she have to switch Endocrinologist due to insurance issue.     Dick
Please call patient to schedule an appointment. Thank you.
yes

## 2023-09-21 RX ORDER — LEVOTHYROXINE SODIUM 112 UG/1
TABLET ORAL
Qty: 90 TABLET | Refills: 0 | OUTPATIENT
Start: 2023-09-21

## (undated) DEVICE — PACK PROCEDURE SURG C SECT KT SMH

## (undated) DEVICE — SOLUTION IV 1000ML 0.9% SOD CHL

## (undated) DEVICE — COVERALL PREM SMS 2XL KNIT --

## (undated) DEVICE — STERILE POLYISOPRENE POWDER-FREE SURGICAL GLOVES: Brand: PROTEXIS

## (undated) DEVICE — DEVON™ KNEE AND BODY STRAP 60" X 3" (1.5 M X 7.6 CM): Brand: DEVON

## (undated) DEVICE — 3000CC GUARDIAN II: Brand: GUARDIAN

## (undated) DEVICE — SPONGE: LAP 18X18 W  200/CS: Brand: MEDICAL ACTION INDUSTRIES

## (undated) DEVICE — LIGHT HANDLE: Brand: DEVON

## (undated) DEVICE — (D)PREP SKN CHLRAPRP APPL 26ML -- CONVERT TO ITEM 371833

## (undated) DEVICE — CATH FOLEY 16F LUBRI-SIL IC --

## (undated) DEVICE — SOLUTION IRRIG 1000ML H2O STRL BLT

## (undated) DEVICE — SUTURE VCRL SZ 0 L36IN ABSRB VLT L40MM CT 1/2 CIR J358H

## (undated) DEVICE — SOLIDIFIER MEDC 1200ML -- CONVERT TO 356117

## (undated) DEVICE — SUTURE VCRL SZ 0 L36IN ABSRB UD L40MM CT 1/2 CIR TAPERPOINT J958H

## (undated) DEVICE — REM POLYHESIVE ADULT PATIENT RETURN ELECTRODE: Brand: VALLEYLAB

## (undated) DEVICE — MEDI-VAC NON-CONDUCTIVE SUCTION TUBING: Brand: CARDINAL HEALTH

## (undated) DEVICE — STAPLER SKIN SQ 30 ABSRB STPL DISP INSORB

## (undated) DEVICE — ROYALSILK SURGICAL GOWN, L: Brand: CONVERTORS

## (undated) DEVICE — KENDALL SCD EXPRESS SLEEVES, KNEE LENGTH, MEDIUM: Brand: KENDALL SCD

## (undated) DEVICE — DRAPE FLD WRM W44XL66IN C6L FOR INTRATEMP SYS THERMABASIN